# Patient Record
Sex: FEMALE | Race: WHITE | NOT HISPANIC OR LATINO | Employment: OTHER | RURAL
[De-identification: names, ages, dates, MRNs, and addresses within clinical notes are randomized per-mention and may not be internally consistent; named-entity substitution may affect disease eponyms.]

---

## 2021-09-10 ENCOUNTER — OFFICE VISIT (OUTPATIENT)
Dept: FAMILY MEDICINE | Facility: CLINIC | Age: 81
End: 2021-09-10

## 2021-09-10 VITALS
OXYGEN SATURATION: 98 % | WEIGHT: 173.63 LBS | DIASTOLIC BLOOD PRESSURE: 72 MMHG | RESPIRATION RATE: 20 BRPM | HEIGHT: 66 IN | HEART RATE: 84 BPM | SYSTOLIC BLOOD PRESSURE: 120 MMHG | BODY MASS INDEX: 27.91 KG/M2

## 2021-09-10 DIAGNOSIS — F32.A DEPRESSION, UNSPECIFIED DEPRESSION TYPE: ICD-10-CM

## 2021-09-10 DIAGNOSIS — E03.9 HYPOTHYROIDISM, UNSPECIFIED TYPE: Primary | ICD-10-CM

## 2021-09-10 DIAGNOSIS — G47.00 INSOMNIA, UNSPECIFIED TYPE: ICD-10-CM

## 2021-09-10 DIAGNOSIS — Z79.899 ENCOUNTER FOR LONG-TERM CURRENT USE OF MEDICATION: ICD-10-CM

## 2021-09-10 LAB
ALBUMIN SERPL BCP-MCNC: 4 G/DL (ref 3.5–5)
ALBUMIN/GLOB SERPL: 1.1 {RATIO}
ALP SERPL-CCNC: 113 U/L (ref 55–142)
ALT SERPL W P-5'-P-CCNC: 24 U/L (ref 13–56)
ANION GAP SERPL CALCULATED.3IONS-SCNC: 8 MMOL/L (ref 7–16)
AST SERPL W P-5'-P-CCNC: 17 U/L (ref 15–37)
BASOPHILS # BLD AUTO: 0.04 K/UL (ref 0–0.2)
BASOPHILS NFR BLD AUTO: 0.6 % (ref 0–1)
BILIRUB SERPL-MCNC: 1 MG/DL (ref 0–1.2)
BUN SERPL-MCNC: 20 MG/DL (ref 7–18)
BUN/CREAT SERPL: 19 (ref 6–20)
CALCIUM SERPL-MCNC: 9.2 MG/DL (ref 8.5–10.1)
CHLORIDE SERPL-SCNC: 104 MMOL/L (ref 98–107)
CHOLEST SERPL-MCNC: 251 MG/DL (ref 0–200)
CHOLEST/HDLC SERPL: 4.8 {RATIO}
CO2 SERPL-SCNC: 29 MMOL/L (ref 21–32)
CREAT SERPL-MCNC: 1.03 MG/DL (ref 0.55–1.02)
DIFFERENTIAL METHOD BLD: ABNORMAL
EOSINOPHIL # BLD AUTO: 0.3 K/UL (ref 0–0.5)
EOSINOPHIL NFR BLD AUTO: 4.5 % (ref 1–4)
ERYTHROCYTE [DISTWIDTH] IN BLOOD BY AUTOMATED COUNT: 14 % (ref 11.5–14.5)
GLOBULIN SER-MCNC: 3.5 G/DL (ref 2–4)
GLUCOSE SERPL-MCNC: 98 MG/DL (ref 74–106)
HCT VFR BLD AUTO: 41.7 % (ref 38–47)
HDLC SERPL-MCNC: 52 MG/DL (ref 40–60)
HGB BLD-MCNC: 13.6 G/DL (ref 12–16)
IMM GRANULOCYTES # BLD AUTO: 0.02 K/UL (ref 0–0.04)
IMM GRANULOCYTES NFR BLD: 0.3 % (ref 0–0.4)
LDLC SERPL CALC-MCNC: 170 MG/DL
LDLC/HDLC SERPL: 3.3 {RATIO}
LYMPHOCYTES # BLD AUTO: 1.95 K/UL (ref 1–4.8)
LYMPHOCYTES NFR BLD AUTO: 29.1 % (ref 27–41)
MCH RBC QN AUTO: 28.9 PG (ref 27–31)
MCHC RBC AUTO-ENTMCNC: 32.6 G/DL (ref 32–36)
MCV RBC AUTO: 88.7 FL (ref 80–96)
MONOCYTES # BLD AUTO: 0.54 K/UL (ref 0–0.8)
MONOCYTES NFR BLD AUTO: 8 % (ref 2–6)
MPC BLD CALC-MCNC: 10 FL (ref 9.4–12.4)
NEUTROPHILS # BLD AUTO: 3.86 K/UL (ref 1.8–7.7)
NEUTROPHILS NFR BLD AUTO: 57.5 % (ref 53–65)
NONHDLC SERPL-MCNC: 199 MG/DL
NRBC # BLD AUTO: 0 X10E3/UL
NRBC, AUTO (.00): 0 %
PLATELET # BLD AUTO: 194 K/UL (ref 150–400)
POTASSIUM SERPL-SCNC: 4 MMOL/L (ref 3.5–5.1)
PROT SERPL-MCNC: 7.5 G/DL (ref 6.4–8.2)
RBC # BLD AUTO: 4.7 M/UL (ref 4.2–5.4)
SODIUM SERPL-SCNC: 137 MMOL/L (ref 136–145)
TRIGL SERPL-MCNC: 143 MG/DL (ref 35–150)
TSH SERPL DL<=0.005 MIU/L-ACNC: 1.22 UIU/ML (ref 0.36–3.74)
VLDLC SERPL-MCNC: 29 MG/DL
WBC # BLD AUTO: 6.71 K/UL (ref 4.5–11)

## 2021-09-10 PROCEDURE — 80061 LIPID PANEL: CPT | Mod: ,,, | Performed by: CLINICAL MEDICAL LABORATORY

## 2021-09-10 PROCEDURE — 80061 LIPID PANEL: ICD-10-PCS | Mod: ,,, | Performed by: CLINICAL MEDICAL LABORATORY

## 2021-09-10 PROCEDURE — 85025 COMPLETE CBC W/AUTO DIFF WBC: CPT | Mod: ,,, | Performed by: CLINICAL MEDICAL LABORATORY

## 2021-09-10 PROCEDURE — 99214 PR OFFICE/OUTPT VISIT, EST, LEVL IV, 30-39 MIN: ICD-10-PCS | Mod: ,,, | Performed by: FAMILY MEDICINE

## 2021-09-10 PROCEDURE — 85025 CBC WITH DIFFERENTIAL: ICD-10-PCS | Mod: ,,, | Performed by: CLINICAL MEDICAL LABORATORY

## 2021-09-10 PROCEDURE — 80053 COMPREHENSIVE METABOLIC PANEL: ICD-10-PCS | Mod: ,,, | Performed by: CLINICAL MEDICAL LABORATORY

## 2021-09-10 PROCEDURE — 84443 TSH: ICD-10-PCS | Mod: ,,, | Performed by: CLINICAL MEDICAL LABORATORY

## 2021-09-10 PROCEDURE — 99214 OFFICE O/P EST MOD 30 MIN: CPT | Mod: ,,, | Performed by: FAMILY MEDICINE

## 2021-09-10 PROCEDURE — 84443 ASSAY THYROID STIM HORMONE: CPT | Mod: ,,, | Performed by: CLINICAL MEDICAL LABORATORY

## 2021-09-10 PROCEDURE — 80053 COMPREHEN METABOLIC PANEL: CPT | Mod: ,,, | Performed by: CLINICAL MEDICAL LABORATORY

## 2021-09-10 RX ORDER — TRAZODONE HYDROCHLORIDE 150 MG/1
150 TABLET ORAL NIGHTLY
Qty: 30 TABLET | Refills: 2 | Status: SHIPPED | OUTPATIENT
Start: 2021-09-10 | End: 2021-09-10

## 2021-09-10 RX ORDER — TRAZODONE HYDROCHLORIDE 150 MG/1
150 TABLET ORAL NIGHTLY
Qty: 90 TABLET | Refills: 0 | Status: SHIPPED | OUTPATIENT
Start: 2021-09-10 | End: 2022-06-13 | Stop reason: SDUPTHER

## 2021-09-10 RX ORDER — SERTRALINE HYDROCHLORIDE 100 MG/1
150 TABLET, FILM COATED ORAL DAILY
Qty: 45 TABLET | Refills: 2 | Status: SHIPPED | OUTPATIENT
Start: 2021-09-10 | End: 2021-09-10

## 2021-09-10 RX ORDER — SERTRALINE HYDROCHLORIDE 100 MG/1
150 TABLET, FILM COATED ORAL DAILY
Qty: 135 TABLET | Refills: 0 | Status: SHIPPED | OUTPATIENT
Start: 2021-09-10 | End: 2022-06-13 | Stop reason: SDUPTHER

## 2021-09-10 RX ORDER — LEVOTHYROXINE SODIUM 137 UG/1
137 TABLET ORAL DAILY
COMMUNITY
Start: 2021-06-05 | End: 2021-09-10 | Stop reason: SDUPTHER

## 2021-09-10 RX ORDER — LEVOTHYROXINE SODIUM 137 UG/1
137 TABLET ORAL DAILY
Qty: 90 TABLET | Refills: 0 | Status: SHIPPED | OUTPATIENT
Start: 2021-09-10 | End: 2022-06-13 | Stop reason: SDUPTHER

## 2021-09-10 RX ORDER — LEVOTHYROXINE SODIUM 137 UG/1
137 TABLET ORAL DAILY
Qty: 30 TABLET | Refills: 2 | Status: SHIPPED | OUTPATIENT
Start: 2021-09-10 | End: 2021-09-10

## 2021-09-10 RX ORDER — TRAZODONE HYDROCHLORIDE 150 MG/1
150 TABLET ORAL NIGHTLY
COMMUNITY
Start: 2021-06-05 | End: 2021-09-10 | Stop reason: SDUPTHER

## 2021-09-10 RX ORDER — SERTRALINE HYDROCHLORIDE 100 MG/1
150 TABLET, FILM COATED ORAL DAILY
COMMUNITY
Start: 2021-07-26 | End: 2021-09-10 | Stop reason: SDUPTHER

## 2021-12-23 ENCOUNTER — OFFICE VISIT (OUTPATIENT)
Dept: FAMILY MEDICINE | Facility: CLINIC | Age: 81
End: 2021-12-23

## 2021-12-23 DIAGNOSIS — R05.9 COUGH: Primary | ICD-10-CM

## 2021-12-23 LAB
CTP QC/QA: YES
FLUAV AG NPH QL: NEGATIVE
FLUBV AG NPH QL: NEGATIVE
SARS-COV-2 AG RESP QL IA.RAPID: POSITIVE

## 2021-12-23 PROCEDURE — 87428 SARSCOV & INF VIR A&B AG IA: CPT | Mod: QW,,, | Performed by: INTERNAL MEDICINE

## 2021-12-23 PROCEDURE — 87428 POCT SARS-COV2 (COVID) WITH FLU ANTIGEN: ICD-10-PCS | Mod: QW,,, | Performed by: INTERNAL MEDICINE

## 2021-12-23 PROCEDURE — 99499 NO LOS: ICD-10-PCS | Mod: ,,, | Performed by: INTERNAL MEDICINE

## 2021-12-23 PROCEDURE — 99499 UNLISTED E&M SERVICE: CPT | Mod: ,,, | Performed by: INTERNAL MEDICINE

## 2021-12-23 NOTE — PROGRESS NOTES
Patient had positive covid test. Informed of order for zinc and vit c to get OTC, to quarantine for 10 days, and to go to ER for any respiratory problems

## 2022-06-13 ENCOUNTER — OFFICE VISIT (OUTPATIENT)
Dept: FAMILY MEDICINE | Facility: CLINIC | Age: 82
End: 2022-06-13
Payer: MEDICARE

## 2022-06-13 VITALS
TEMPERATURE: 98 F | OXYGEN SATURATION: 98 % | BODY MASS INDEX: 25.36 KG/M2 | RESPIRATION RATE: 20 BRPM | SYSTOLIC BLOOD PRESSURE: 124 MMHG | WEIGHT: 157.81 LBS | DIASTOLIC BLOOD PRESSURE: 62 MMHG | HEIGHT: 66 IN | HEART RATE: 93 BPM

## 2022-06-13 DIAGNOSIS — N28.9 RENAL INSUFFICIENCY: Primary | ICD-10-CM

## 2022-06-13 DIAGNOSIS — F32.A DEPRESSION, UNSPECIFIED DEPRESSION TYPE: ICD-10-CM

## 2022-06-13 DIAGNOSIS — E03.9 HYPOTHYROIDISM, UNSPECIFIED TYPE: ICD-10-CM

## 2022-06-13 DIAGNOSIS — G47.00 INSOMNIA, UNSPECIFIED TYPE: ICD-10-CM

## 2022-06-13 LAB
ANION GAP SERPL CALCULATED.3IONS-SCNC: 10 MMOL/L (ref 7–16)
BUN SERPL-MCNC: 16 MG/DL (ref 7–18)
BUN/CREAT SERPL: 17 (ref 6–20)
CALCIUM SERPL-MCNC: 9.2 MG/DL (ref 8.5–10.1)
CHLORIDE SERPL-SCNC: 108 MMOL/L (ref 98–107)
CO2 SERPL-SCNC: 26 MMOL/L (ref 21–32)
CREAT SERPL-MCNC: 0.94 MG/DL (ref 0.55–1.02)
GLUCOSE SERPL-MCNC: 93 MG/DL (ref 74–106)
POTASSIUM SERPL-SCNC: 4.1 MMOL/L (ref 3.5–5.1)
SODIUM SERPL-SCNC: 140 MMOL/L (ref 136–145)
TSH SERPL DL<=0.005 MIU/L-ACNC: 0.18 UIU/ML (ref 0.36–3.74)

## 2022-06-13 PROCEDURE — 99213 PR OFFICE/OUTPT VISIT, EST, LEVL III, 20-29 MIN: ICD-10-PCS | Mod: ,,, | Performed by: NURSE PRACTITIONER

## 2022-06-13 PROCEDURE — 84443 ASSAY THYROID STIM HORMONE: CPT | Mod: ,,, | Performed by: CLINICAL MEDICAL LABORATORY

## 2022-06-13 PROCEDURE — 80048 BASIC METABOLIC PNL TOTAL CA: CPT | Mod: ,,, | Performed by: CLINICAL MEDICAL LABORATORY

## 2022-06-13 PROCEDURE — 80048 BASIC METABOLIC PANEL: ICD-10-PCS | Mod: ,,, | Performed by: CLINICAL MEDICAL LABORATORY

## 2022-06-13 PROCEDURE — 84443 TSH: ICD-10-PCS | Mod: ,,, | Performed by: CLINICAL MEDICAL LABORATORY

## 2022-06-13 PROCEDURE — 99213 OFFICE O/P EST LOW 20 MIN: CPT | Mod: ,,, | Performed by: NURSE PRACTITIONER

## 2022-06-13 RX ORDER — LEVOTHYROXINE SODIUM 137 UG/1
137 TABLET ORAL DAILY
Qty: 90 TABLET | Refills: 1 | Status: SHIPPED | OUTPATIENT
Start: 2022-06-13 | End: 2022-06-23 | Stop reason: DRUGHIGH

## 2022-06-13 RX ORDER — TRAZODONE HYDROCHLORIDE 150 MG/1
150 TABLET ORAL NIGHTLY
Qty: 90 TABLET | Refills: 1 | Status: SHIPPED | OUTPATIENT
Start: 2022-06-13 | End: 2023-03-29 | Stop reason: SDUPTHER

## 2022-06-13 RX ORDER — SERTRALINE HYDROCHLORIDE 100 MG/1
150 TABLET, FILM COATED ORAL DAILY
Qty: 135 TABLET | Refills: 1 | Status: SHIPPED | OUTPATIENT
Start: 2022-06-13 | End: 2023-03-29 | Stop reason: SDUPTHER

## 2022-06-13 NOTE — PROGRESS NOTES
[facility]  Chief Complaint      Chief Complaint   Patient presents with    Thyroid Problem     Due for repeat TSH and requesting refill of Levothyroxine     Depression     Requesting refill of Zoloft     Insomnia     Requesting refill of Trazodone    Hyperlipidemia     Not on a statin at the current time       History of Present Illness      Heydi Ceja is a 82 y.o. female with chronic conditions of Depression, Insomnia, Hyperlipidemia, and Eczema who presents today for medical refills. She denies any problems today. Her last lipid panel was 09/10/2021 Chol 251 Trig 143  HDL 52. She is not on a statin. Her last TSH was 1.220 (0.358-3.740). She is on Levothyroxine 137 mcg once daily. She is tolerating her Zoloft and Desyrel for her Depression and Insomnia.     Past Medical History:  Past Medical History:   Diagnosis Date    Depression     Eczema     Hyperlipidemia     Insomnia        Past Surgical History:   has no past surgical history on file.    Social History:  Social History     Tobacco Use    Smoking status: Never Smoker    Smokeless tobacco: Never Used   Substance Use Topics    Alcohol use: Never    Drug use: Never       I personally reviewed all past medical, surgical, and social.     Review of Systems   Constitutional: Negative.    HENT: Negative.    Respiratory: Negative.    Cardiovascular: Negative.    Gastrointestinal: Negative.    Endocrine: Negative.    Genitourinary: Negative.    Musculoskeletal: Negative.    Skin: Negative.    Allergic/Immunologic: Negative.    Neurological: Negative.    Hematological: Negative.    Psychiatric/Behavioral: Positive for sleep disturbance.        Medications:  Outpatient Encounter Medications as of 6/13/2022   Medication Sig Dispense Refill    levothyroxine (SYNTHROID) 137 MCG Tab tablet Take 1 tablet (137 mcg total) by mouth once daily. 90 tablet 1    sertraline (ZOLOFT) 100 MG tablet Take 1.5 tablets (150 mg total) by  "mouth once daily. 135 tablet 1    traZODone (DESYREL) 150 MG tablet Take 1 tablet (150 mg total) by mouth nightly. 90 tablet 1    [DISCONTINUED] levothyroxine (SYNTHROID) 137 MCG Tab tablet Take 1 tablet (137 mcg total) by mouth once daily. 90 tablet 0    [DISCONTINUED] sertraline (ZOLOFT) 100 MG tablet Take 1.5 tablets (150 mg total) by mouth once daily. 135 tablet 0    [DISCONTINUED] traZODone (DESYREL) 150 MG tablet Take 1 tablet (150 mg total) by mouth nightly. 90 tablet 0     No facility-administered encounter medications on file as of 6/13/2022.       Allergies:  Review of patient's allergies indicates:  No Known Allergies    Health Maintenance:    There is no immunization history on file for this patient.   Health Maintenance   Topic Date Due    TETANUS VACCINE  Never done    DEXA Scan  Never done    Lipid Panel  09/10/2026        Physical Exam      Vital Signs  Temp: 98.4 °F (36.9 °C)  Temp src: Oral  Pulse: 93  Resp: 20  SpO2: 98 %  BP: 124/62  BP Location: Right arm  Patient Position: Sitting  Height and Weight  Height: 5' 6" (167.6 cm)  Weight: 71.6 kg (157 lb 12.8 oz)  BSA (Calculated - sq m): 1.83 sq meters  BMI (Calculated): 25.5  Weight in (lb) to have BMI = 25: 154.6]    Physical Exam  HENT:      Right Ear: Tympanic membrane normal.      Left Ear: Tympanic membrane normal.      Mouth/Throat:      Mouth: Mucous membranes are moist.   Cardiovascular:      Rate and Rhythm: Normal rate and regular rhythm.      Pulses: Normal pulses.      Heart sounds: Normal heart sounds.   Pulmonary:      Effort: Pulmonary effort is normal.      Breath sounds: Normal breath sounds.   Abdominal:      General: Bowel sounds are normal.      Palpations: Abdomen is soft.   Neurological:      Mental Status: She is alert and oriented to person, place, and time.   Psychiatric:         Behavior: Behavior normal.          Laboratory:  CBC:  Recent Labs   Lab 09/10/21  1633   WBC 6.71   RBC 4.70   Hemoglobin 13.6 "   Hematocrit 41.7   Platelet Count 194   MCV 88.7   MCH 28.9   MCHC 32.6     CMP:  Recent Labs   Lab 09/10/21  1633   Glucose 98   Calcium 9.2   Albumin 4.0   Total Protein 7.5   Sodium 137   Potassium 4.0   CO2 29   Chloride 104   BUN 20 H   Alk Phos 113   ALT 24   AST 17   Bilirubin, Total 1.0     LIPIDS:  Recent Labs   Lab 09/10/21  1633   TSH 1.220   HDL Cholesterol 52   Cholesterol 251 H   Triglycerides 143   LDL Calculated 170   Cholesterol/HDL Ratio (Risk Factor) 4.8   Non-     TSH:  Recent Labs   Lab 09/10/21  1633   TSH 1.220           Assessment/Plan      1. Hypothyroidism, unspecified type  - levothyroxine (SYNTHROID) 137 MCG Tab tablet; Take 1 tablet (137 mcg total) by mouth once daily.  Dispense: 90 tablet; Refill: 1  - TSH; Future  - TSH    2. Depression, unspecified depression type  - sertraline (ZOLOFT) 100 MG tablet; Take 1.5 tablets (150 mg total) by mouth once daily.  Dispense: 135 tablet; Refill: 1    3. Insomnia, unspecified type  - traZODone (DESYREL) 150 MG tablet; Take 1 tablet (150 mg total) by mouth nightly.  Dispense: 90 tablet; Refill: 1    4. Renal insufficiency  - Basic Metabolic Panel; Future  - Basic Metabolic Panel        Return to clinic in 6  months.    EVON [facility]

## 2022-06-22 ENCOUNTER — APPOINTMENT (OUTPATIENT)
Dept: RADIOLOGY | Facility: CLINIC | Age: 82
End: 2022-06-22
Attending: INTERNAL MEDICINE
Payer: MEDICARE

## 2022-06-22 ENCOUNTER — OFFICE VISIT (OUTPATIENT)
Dept: FAMILY MEDICINE | Facility: CLINIC | Age: 82
End: 2022-06-22
Payer: MEDICARE

## 2022-06-22 VITALS
BODY MASS INDEX: 25.29 KG/M2 | HEIGHT: 66 IN | WEIGHT: 157.38 LBS | OXYGEN SATURATION: 96 % | DIASTOLIC BLOOD PRESSURE: 84 MMHG | TEMPERATURE: 97 F | RESPIRATION RATE: 20 BRPM | SYSTOLIC BLOOD PRESSURE: 128 MMHG | HEART RATE: 105 BPM

## 2022-06-22 DIAGNOSIS — R19.7 DIARRHEA, UNSPECIFIED TYPE: Primary | ICD-10-CM

## 2022-06-22 DIAGNOSIS — R19.7 DIARRHEA, UNSPECIFIED TYPE: ICD-10-CM

## 2022-06-22 DIAGNOSIS — R94.5 ABNORMAL RESULTS OF LIVER FUNCTION STUDIES: ICD-10-CM

## 2022-06-22 PROCEDURE — 74018 RADEX ABDOMEN 1 VIEW: CPT | Mod: TC,RHCUB | Performed by: INTERNAL MEDICINE

## 2022-06-22 PROCEDURE — 99214 PR OFFICE/OUTPT VISIT, EST, LEVL IV, 30-39 MIN: ICD-10-PCS | Mod: ,,, | Performed by: INTERNAL MEDICINE

## 2022-06-22 PROCEDURE — 80074 ACUTE HEPATITIS PANEL: CPT | Mod: ,,, | Performed by: CLINICAL MEDICAL LABORATORY

## 2022-06-22 PROCEDURE — 99214 OFFICE O/P EST MOD 30 MIN: CPT | Mod: ,,, | Performed by: INTERNAL MEDICINE

## 2022-06-22 PROCEDURE — 80074 HEPATITIS PANEL, ACUTE: ICD-10-PCS | Mod: ,,, | Performed by: CLINICAL MEDICAL LABORATORY

## 2022-06-22 NOTE — PROGRESS NOTES
Subjective:       Patient ID: Heydi Ceja is a 82 y.o. female.    Chief Complaint: Establish Care    Patient is here today for new patient evaluation. Has past medical history of depression, insomnia, hyperlipidemia, and eczema. Patient recent labs reviewed and all within normal limits. Patient is concerned she has Hep C and states she has all the symptoms; diarrhea, discoloration of toes, itching, etc. Will order Hep panel today. Patient states she has had the diarrhea for the past 3 weeks. Will order stools for C.Diff, WBC with Diff, O&P, GS and Cul. Will also order KUB today. Will follow in 1 month.        Current Medications:    Current Outpatient Medications:     levothyroxine (SYNTHROID) 137 MCG Tab tablet, Take 1 tablet (137 mcg total) by mouth once daily., Disp: 90 tablet, Rfl: 1    sertraline (ZOLOFT) 100 MG tablet, Take 1.5 tablets (150 mg total) by mouth once daily., Disp: 135 tablet, Rfl: 1    traZODone (DESYREL) 150 MG tablet, Take 1 tablet (150 mg total) by mouth nightly., Disp: 90 tablet, Rfl: 1    Last Labs:     Office Visit on 06/13/2022   Component Date Value    TSH 06/13/2022 0.176 (A)    Sodium 06/13/2022 140     Potassium 06/13/2022 4.1     Chloride 06/13/2022 108 (A)    CO2 06/13/2022 26     Anion Gap 06/13/2022 10     Glucose 06/13/2022 93     BUN 06/13/2022 16     Creatinine 06/13/2022 0.94     BUN/Creatinine Ratio 06/13/2022 17     Calcium 06/13/2022 9.2     eGFR 06/13/2022 61        Last Imaging:  X-Ray KUB  Narrative: EXAMINATION:  XR KUB    CLINICAL HISTORY:  Abdominal pain    COMPARISON:  None available    FINDINGS:  No free fluid or free air seen.  The bowel gas pattern appears within normal limits.  Calcification seen in the right upper quadrant, detail is limited.  No other abnormal calcifications are present.  No other abnormality is identified.  Impression: Calcifications right upper quadrant could represent cholelithiasis or possibly renal  calculi.    Electronically signed by: Leonidas Orozco  Date:    06/22/2022  Time:    16:13         Review of Systems   Constitutional: Positive for fatigue.   Gastrointestinal: Positive for diarrhea.   All other systems reviewed and are negative.        Objective:      Physical Exam  Vitals reviewed.   Constitutional:       Appearance: Normal appearance.   Cardiovascular:      Rate and Rhythm: Normal rate and regular rhythm.      Pulses: Normal pulses.      Heart sounds: Normal heart sounds.   Pulmonary:      Effort: Pulmonary effort is normal.      Breath sounds: Normal breath sounds.   Abdominal:      General: Abdomen is flat. Bowel sounds are normal.      Palpations: Abdomen is soft.   Musculoskeletal:         General: Normal range of motion.      Cervical back: Normal range of motion and neck supple.   Skin:     General: Skin is warm and dry.   Neurological:      General: No focal deficit present.      Mental Status: She is alert and oriented to person, place, and time. Mental status is at baseline.         Assessment:       1. Diarrhea, unspecified type  Hepatitis Panel, Acute    X-Ray KUB    Ova and Parasite Exam    C Diff Toxin by PCR    Fecal leukocytes    Gram Stain    Hepatitis Panel, Acute   2. Abnormal results of liver function studies   Hepatitis Panel, Acute    Hepatitis Panel, Acute        Plan:         Heydi was seen today for establish care.    Diagnoses and all orders for this visit:    Diarrhea, unspecified type  -     Hepatitis Panel, Acute; Future  -     X-Ray KUB; Future  -     Ova and Parasite Exam; Future  -     C Diff Toxin by PCR; Future  -     Fecal leukocytes; Future  -     Gram Stain; Future  -     Hepatitis Panel, Acute    Abnormal results of liver function studies   -     Hepatitis Panel, Acute; Future  -     Hepatitis Panel, Acute

## 2022-06-23 LAB
HAV IGM SER QL: NORMAL
HBV CORE IGM SER QL: NORMAL
HBV SURFACE AG SERPL QL IA: NORMAL
HCV AB SER QL: NORMAL

## 2022-06-23 RX ORDER — LEVOTHYROXINE SODIUM 125 UG/1
125 TABLET ORAL
Qty: 30 TABLET | Refills: 1 | Status: SHIPPED | OUTPATIENT
Start: 2022-06-23 | End: 2022-07-12 | Stop reason: SDUPTHER

## 2022-06-23 NOTE — TELEPHONE ENCOUNTER
Pt. Informed of abnormal TSH level 0.176. Pt. Informed we will send in rx for Levothyroxine 125 mcg - take 1 tab po daily and return to clinic in 4-6 weeks to repeat TSH. Standing order will be placed in epic. Pt. Expressed verbal understanding. Rx escribed to [facility] pharmacy per pt.'s request.

## 2022-06-24 LAB
FECAL LEUKOCYTES: NORMAL /HPF
GRAM STN SPEC: NORMAL

## 2022-06-24 PROCEDURE — 87209 SMEAR COMPLEX STAIN: CPT | Mod: ,,, | Performed by: CLINICAL MEDICAL LABORATORY

## 2022-06-24 PROCEDURE — 87205 GRAM STAIN: ICD-10-PCS | Mod: ,,, | Performed by: CLINICAL MEDICAL LABORATORY

## 2022-06-24 PROCEDURE — 87177 OVA AND PARASITE EXAM: ICD-10-PCS | Mod: ,,, | Performed by: CLINICAL MEDICAL LABORATORY

## 2022-06-24 PROCEDURE — 87205 SMEAR GRAM STAIN: CPT | Mod: ,,, | Performed by: CLINICAL MEDICAL LABORATORY

## 2022-06-24 PROCEDURE — 87177 OVA AND PARASITES SMEARS: CPT | Mod: ,,, | Performed by: CLINICAL MEDICAL LABORATORY

## 2022-06-24 PROCEDURE — 87493 C DIFF AMPLIFIED PROBE: CPT | Mod: ,,, | Performed by: CLINICAL MEDICAL LABORATORY

## 2022-06-24 PROCEDURE — 89055 FECAL LEUKOCYTES: ICD-10-PCS | Mod: ,,, | Performed by: CLINICAL MEDICAL LABORATORY

## 2022-06-24 PROCEDURE — 89055 LEUKOCYTE ASSESSMENT FECAL: CPT | Mod: ,,, | Performed by: CLINICAL MEDICAL LABORATORY

## 2022-06-24 PROCEDURE — 87209 OVA AND PARASITE EXAM: ICD-10-PCS | Mod: ,,, | Performed by: CLINICAL MEDICAL LABORATORY

## 2022-06-24 PROCEDURE — 87493 C DIFF TOXIN BY PCR: ICD-10-PCS | Mod: ,,, | Performed by: CLINICAL MEDICAL LABORATORY

## 2022-06-25 LAB — C DIFF TOX A+B STL IA-ACNC: NEGATIVE

## 2022-06-26 LAB
O+P STL CONC: NORMAL
TRICHROME SMEAR: NORMAL

## 2022-07-12 ENCOUNTER — OFFICE VISIT (OUTPATIENT)
Dept: FAMILY MEDICINE | Facility: CLINIC | Age: 82
End: 2022-07-12
Payer: MEDICARE

## 2022-07-12 VITALS
TEMPERATURE: 96 F | BODY MASS INDEX: 25.39 KG/M2 | HEIGHT: 66 IN | HEART RATE: 93 BPM | DIASTOLIC BLOOD PRESSURE: 64 MMHG | RESPIRATION RATE: 20 BRPM | WEIGHT: 158 LBS | OXYGEN SATURATION: 96 % | SYSTOLIC BLOOD PRESSURE: 108 MMHG

## 2022-07-12 DIAGNOSIS — K80.20 CALCULUS OF GALLBLADDER WITHOUT CHOLECYSTITIS WITHOUT OBSTRUCTION: ICD-10-CM

## 2022-07-12 DIAGNOSIS — E03.8 OTHER SPECIFIED HYPOTHYROIDISM: Primary | ICD-10-CM

## 2022-07-12 DIAGNOSIS — R11.0 NAUSEA: ICD-10-CM

## 2022-07-12 DIAGNOSIS — K83.1 CHOLESTASIS: ICD-10-CM

## 2022-07-12 DIAGNOSIS — R19.7 DIARRHEA, UNSPECIFIED TYPE: ICD-10-CM

## 2022-07-12 PROCEDURE — 99214 OFFICE O/P EST MOD 30 MIN: CPT | Mod: ,,, | Performed by: INTERNAL MEDICINE

## 2022-07-12 PROCEDURE — 99214 PR OFFICE/OUTPT VISIT, EST, LEVL IV, 30-39 MIN: ICD-10-PCS | Mod: ,,, | Performed by: INTERNAL MEDICINE

## 2022-07-12 RX ORDER — LEVOTHYROXINE SODIUM 125 UG/1
125 TABLET ORAL
Qty: 30 TABLET | Refills: 3 | Status: SHIPPED | OUTPATIENT
Start: 2022-07-12 | End: 2022-11-16

## 2022-07-12 RX ORDER — DIPHENOXYLATE HYDROCHLORIDE AND ATROPINE SULFATE 2.5; .025 MG/1; MG/1
1 TABLET ORAL 2 TIMES DAILY PRN
Qty: 20 TABLET | Refills: 0 | Status: SHIPPED | OUTPATIENT
Start: 2022-07-12 | End: 2022-07-22

## 2022-07-12 RX ORDER — ONDANSETRON 4 MG/1
4 TABLET, FILM COATED ORAL 2 TIMES DAILY PRN
Qty: 20 TABLET | Refills: 0 | Status: ON HOLD | OUTPATIENT
Start: 2022-07-12 | End: 2022-09-02 | Stop reason: HOSPADM

## 2022-07-13 NOTE — PROGRESS NOTES
Subjective:       Patient ID: Heydi Ceja is a 82 y.o. female.    Chief Complaint: Results    Patient is here today for a follow up evaluation. Patient has a complaint of severe lower abdominal discomfort. Recent KUB reviewed, results show possible kidney stones or gallstones. Will order STAT CT Abdomen/Pelvis for possible kidney stones or gallstones. Patient has a complaint of severe nausea and diarrhea. Will order Zofran 4mg PO BID PRN for nausea #20. Will order Lomotil PO BID PRN for diarrhea #20. Patient concerned about TSH. Will order TSH in 3 weeks. Will order Synthroid 125 mcg PO QD #30 RF3. Will follow in 2 months.       Current Medications:    Current Outpatient Medications:     sertraline (ZOLOFT) 100 MG tablet, Take 1.5 tablets (150 mg total) by mouth once daily., Disp: 135 tablet, Rfl: 1    traZODone (DESYREL) 150 MG tablet, Take 1 tablet (150 mg total) by mouth nightly., Disp: 90 tablet, Rfl: 1    diphenoxylate-atropine 2.5-0.025 mg (LOMOTIL) 2.5-0.025 mg per tablet, Take 1 tablet by mouth 2 (two) times daily as needed for Diarrhea., Disp: 20 tablet, Rfl: 0    levothyroxine (SYNTHROID) 125 MCG tablet, Take 1 tablet (125 mcg total) by mouth before breakfast., Disp: 30 tablet, Rfl: 3    ondansetron (ZOFRAN) 4 MG tablet, Take 1 tablet (4 mg total) by mouth 2 (two) times daily as needed for Nausea., Disp: 20 tablet, Rfl: 0    Last Labs:     Office Visit on 06/22/2022   Component Date Value    Hepatits A Ab, IgM 06/22/2022 Non-Reactive     Hepatitis B Surface Ag 06/22/2022 Non-Reactive     Hepatitis B Core Ab, IgM 06/22/2022 Non-Reactive     Hepatitis C Ab 06/22/2022 Non-Reactive     Ova and Parasite Exam 06/24/2022 No Ova and Parasites Seen     Trichrome Smear 06/24/2022 No Ova and Parasites Seen     Clostridium difficile To* 06/24/2022 Negative     Fecal Leukocytes 06/24/2022 None Seen     Gram Stain Result 06/24/2022 Many Gram positive cocci     Gram Stain Result 06/24/2022 Many Gram  Positive Rods     Gram Stain Result 06/24/2022 Many Gram negative cocci     Gram Stain Result 06/24/2022 Many Gram Negative Rods    Office Visit on 06/13/2022   Component Date Value    TSH 06/13/2022 0.176 (A)    Sodium 06/13/2022 140     Potassium 06/13/2022 4.1     Chloride 06/13/2022 108 (A)    CO2 06/13/2022 26     Anion Gap 06/13/2022 10     Glucose 06/13/2022 93     BUN 06/13/2022 16     Creatinine 06/13/2022 0.94     BUN/Creatinine Ratio 06/13/2022 17     Calcium 06/13/2022 9.2     eGFR 06/13/2022 61        Last Imaging:  X-Ray KUB  Narrative: EXAMINATION:  XR KUB    CLINICAL HISTORY:  Abdominal pain    COMPARISON:  None available    FINDINGS:  No free fluid or free air seen.  The bowel gas pattern appears within normal limits.  Calcification seen in the right upper quadrant, detail is limited.  No other abnormal calcifications are present.  No other abnormality is identified.  Impression: Calcifications right upper quadrant could represent cholelithiasis or possibly renal calculi.    Electronically signed by: Leonidas Orozco  Date:    06/22/2022  Time:    16:13         Review of Systems   Gastrointestinal: Positive for abdominal pain, diarrhea and nausea.   All other systems reviewed and are negative.        Objective:      Physical Exam  Vitals reviewed.   Constitutional:       Appearance: Normal appearance. She is normal weight.   Cardiovascular:      Rate and Rhythm: Normal rate and regular rhythm.      Pulses: Normal pulses.      Heart sounds: Normal heart sounds.   Pulmonary:      Effort: Pulmonary effort is normal.      Breath sounds: Normal breath sounds.   Abdominal:      General: Abdomen is flat. Bowel sounds are normal.      Palpations: Abdomen is soft.   Musculoskeletal:         General: Normal range of motion.      Cervical back: Normal range of motion and neck supple.   Skin:     General: Skin is warm and dry.   Neurological:      General: No focal deficit present.      Mental  Status: She is alert and oriented to person, place, and time. Mental status is at baseline.         Assessment:       1. Other specified hypothyroidism  TSH   2. Calculus of gallbladder without cholecystitis without obstruction  CT Abdomen Pelvis  Without Contrast   3. Cholestasis  CT Abdomen Pelvis  Without Contrast   4. Diarrhea, unspecified type     5. Nausea          Plan:         Heydi was seen today for results.    Diagnoses and all orders for this visit:    Other specified hypothyroidism  -     TSH; Future    Calculus of gallbladder without cholecystitis without obstruction  -     CT Abdomen Pelvis  Without Contrast; Future    Cholestasis  -     CT Abdomen Pelvis  Without Contrast; Future    Diarrhea, unspecified type    Nausea    Other orders  -     levothyroxine (SYNTHROID) 125 MCG tablet; Take 1 tablet (125 mcg total) by mouth before breakfast.  -     diphenoxylate-atropine 2.5-0.025 mg (LOMOTIL) 2.5-0.025 mg per tablet; Take 1 tablet by mouth 2 (two) times daily as needed for Diarrhea.  -     ondansetron (ZOFRAN) 4 MG tablet; Take 1 tablet (4 mg total) by mouth 2 (two) times daily as needed for Nausea.

## 2022-07-13 NOTE — PATIENT INSTRUCTIONS
Heydi was seen today for results.    Diagnoses and all orders for this visit:    Other specified hypothyroidism  -     TSH; Future    Calculus of gallbladder without cholecystitis without obstruction  -     CT Abdomen Pelvis  Without Contrast; Future    Cholestasis  -     CT Abdomen Pelvis  Without Contrast; Future    Diarrhea, unspecified type    Nausea    Other orders  -     levothyroxine (SYNTHROID) 125 MCG tablet; Take 1 tablet (125 mcg total) by mouth before breakfast.  -     diphenoxylate-atropine 2.5-0.025 mg (LOMOTIL) 2.5-0.025 mg per tablet; Take 1 tablet by mouth 2 (two) times daily as needed for Diarrhea.  -     ondansetron (ZOFRAN) 4 MG tablet; Take 1 tablet (4 mg total) by mouth 2 (two) times daily as needed for Nausea.

## 2022-07-15 ENCOUNTER — OFFICE VISIT (OUTPATIENT)
Dept: FAMILY MEDICINE | Facility: CLINIC | Age: 82
End: 2022-07-15
Payer: MEDICARE

## 2022-07-15 VITALS
OXYGEN SATURATION: 97 % | BODY MASS INDEX: 25.39 KG/M2 | TEMPERATURE: 98 F | WEIGHT: 158 LBS | HEIGHT: 66 IN | RESPIRATION RATE: 18 BRPM | HEART RATE: 81 BPM

## 2022-07-15 DIAGNOSIS — Z20.822 CONTACT WITH AND (SUSPECTED) EXPOSURE TO COVID-19: Primary | ICD-10-CM

## 2022-07-15 LAB
CTP QC/QA: YES
FLUAV AG NPH QL: NEGATIVE
FLUBV AG NPH QL: NEGATIVE
SARS-COV-2 AG RESP QL IA.RAPID: NEGATIVE

## 2022-07-15 PROCEDURE — 99499 NO LOS: ICD-10-PCS | Mod: ,,, | Performed by: NURSE PRACTITIONER

## 2022-07-15 PROCEDURE — 99499 UNLISTED E&M SERVICE: CPT | Mod: ,,, | Performed by: NURSE PRACTITIONER

## 2022-07-15 PROCEDURE — 87428 SARSCOV & INF VIR A&B AG IA: CPT | Mod: RHCUB | Performed by: NURSE PRACTITIONER

## 2022-07-15 NOTE — PROGRESS NOTES
1357-patient here to be covid tested. She says she was exposed 2 days ago. Her son is a resident a a nursing facility and he tested positive. Patient asked to be called with results in about 30 minutes due to her cell phone almost dead and she doesn't have a  with her.   1801-NPRadha notified.  3263-called patient to let her know of negative test results but be sure to watch if symptoms get worse. Urged patient to go to ER if she has severe shortness of breath. Voiced understanding.

## 2022-07-18 DIAGNOSIS — Z71.89 COMPLEX CARE COORDINATION: ICD-10-CM

## 2022-07-22 ENCOUNTER — HOSPITAL ENCOUNTER (OUTPATIENT)
Dept: RADIOLOGY | Facility: HOSPITAL | Age: 82
Discharge: HOME | End: 2022-07-22
Attending: INTERNAL MEDICINE
Payer: MEDICARE

## 2022-07-22 ENCOUNTER — TELEPHONE (OUTPATIENT)
Dept: FAMILY MEDICINE | Facility: CLINIC | Age: 82
End: 2022-07-22
Payer: MEDICARE

## 2022-07-22 DIAGNOSIS — K83.1 CHOLESTASIS: ICD-10-CM

## 2022-07-22 DIAGNOSIS — K80.20 CALCULUS OF GALLBLADDER WITHOUT CHOLECYSTITIS WITHOUT OBSTRUCTION: ICD-10-CM

## 2022-07-22 PROCEDURE — 74176 CT ABD & PELVIS W/O CONTRAST: CPT | Mod: TC

## 2022-07-22 PROCEDURE — 74176 CT ABD & PELVIS W/O CONTRAST: CPT | Mod: 26,,, | Performed by: STUDENT IN AN ORGANIZED HEALTH CARE EDUCATION/TRAINING PROGRAM

## 2022-07-22 PROCEDURE — 74176 CT ABDOMEN PELVIS WITHOUT CONTRAST: ICD-10-PCS | Mod: 26,,, | Performed by: STUDENT IN AN ORGANIZED HEALTH CARE EDUCATION/TRAINING PROGRAM

## 2022-07-22 NOTE — TELEPHONE ENCOUNTER
----- Message from Jose Rodriguez MD sent at 7/22/2022 12:44 PM CDT -----  Need to see in  1 week please  abnl results       Called other number on profile, Devante says patient is her cousin and is supposed to come by her house today. She says she will tell her to call. Informed Karenvaleriano that patient can make appointment with the  when she calls back.

## 2022-07-22 NOTE — TELEPHONE ENCOUNTER
1016-Dr. Levy called from [phone]. He reports patient results from ct scan shows left breast CA and will need referral. Informed him Dr. Rodriguez not in today, will inform him of results.  1035-attempted to call patient to schedule appointment for Monday, no answer, not able to leave message.  1102-called patient, no answer, left message to call back and make a follow up appointment for Monday.

## 2022-07-25 ENCOUNTER — OFFICE VISIT (OUTPATIENT)
Dept: FAMILY MEDICINE | Facility: CLINIC | Age: 82
End: 2022-07-25
Payer: MEDICARE

## 2022-07-25 VITALS
TEMPERATURE: 96 F | RESPIRATION RATE: 20 BRPM | SYSTOLIC BLOOD PRESSURE: 110 MMHG | OXYGEN SATURATION: 95 % | HEIGHT: 66 IN | HEART RATE: 104 BPM | DIASTOLIC BLOOD PRESSURE: 70 MMHG | BODY MASS INDEX: 25.26 KG/M2 | WEIGHT: 157.19 LBS

## 2022-07-25 DIAGNOSIS — N63.20 LEFT BREAST MASS: Primary | ICD-10-CM

## 2022-07-25 DIAGNOSIS — Z12.31 ENCOUNTER FOR SCREENING MAMMOGRAM FOR MALIGNANT NEOPLASM OF BREAST: ICD-10-CM

## 2022-07-25 DIAGNOSIS — K80.20 GALLSTONES: ICD-10-CM

## 2022-07-25 PROCEDURE — 99214 OFFICE O/P EST MOD 30 MIN: CPT | Mod: ,,, | Performed by: INTERNAL MEDICINE

## 2022-07-25 PROCEDURE — 99214 PR OFFICE/OUTPT VISIT, EST, LEVL IV, 30-39 MIN: ICD-10-PCS | Mod: ,,, | Performed by: INTERNAL MEDICINE

## 2022-07-25 NOTE — PROGRESS NOTES
Subjective:       Patient ID: Heydi Ceja is a 82 y.o. female.    Chief Complaint: Follow-up and Results (CT Results)    Patient is here today for check up evaluation. Patient recent CT abdomen reviewed and shows large stone in neck of gallbladder and multiple stones throughout  And it was recommended to be referred to general surgery. Patient reports no pain on palpation of CT also showed mass found in left breast and will need to refer to the breast clinic. States she has not has mammogram in a long time. States will go to the walk in clinic ASAP. Will follow in 6 weeks.      Current Medications:    Current Outpatient Medications:     levothyroxine (SYNTHROID) 125 MCG tablet, Take 1 tablet (125 mcg total) by mouth before breakfast., Disp: 30 tablet, Rfl: 3    ondansetron (ZOFRAN) 4 MG tablet, Take 1 tablet (4 mg total) by mouth 2 (two) times daily as needed for Nausea., Disp: 20 tablet, Rfl: 0    sertraline (ZOLOFT) 100 MG tablet, Take 1.5 tablets (150 mg total) by mouth once daily., Disp: 135 tablet, Rfl: 1    traZODone (DESYREL) 150 MG tablet, Take 1 tablet (150 mg total) by mouth nightly., Disp: 90 tablet, Rfl: 1    Last Labs:     Office Visit on 07/15/2022   Component Date Value    SARS Coronavirus 2 Antig* 07/15/2022 Negative     Rapid Influenza A Ag 07/15/2022 Negative     Rapid Influenza B Ag 07/15/2022 Negative      Acceptab* 07/15/2022 Yes        Last Imaging:  CT Abdomen Pelvis  Without Contrast  Narrative: EXAMINATION:  CT ABDOMEN PELVIS WITHOUT CONTRAST    CLINICAL HISTORY:  gall stones vs renal stones;Calculus of gallbladder without cholecystitis without obstruction    COMPARISON:  6/22/22    TECHNIQUE:  CT ABDOMEN PELVIS WITHOUT CONTRAST    FINDINGS:  Lower lobes: Clear.    Cardiac: No effusion.    Chest wall: There is a mass located within the left breast measuring up to 2.5 cm.    Abdomen:    Hepatobiliary/gallbladder: No focal liver lesion.  Large gallstone in the  gallbladder neck measuring 2.3 cm.  There are multiple other gallstones located within the gallbladder.  No ductal obstruction.    Spleen: Enlarged spleen measuring 13 cm    Pancreas: Normal for noncontrast technique.    Adrenal/Genitourinary system: Normal for noncontrast technique.    Bowel and Mesentery: There is no evidence for bowel obstruction.    Peritoneum: Normal.    Retroperitoneum: No enlarged lymph nodes.    Vasculature: Vascular disease    Lymph nodes: No enlarged lymph nodes.    Abdominal wall: Normal.    Osseous structures: Postoperative changes of the hips  Impression: Large gallstone in the gallbladder neck measuring 2.3 cm. There are multiple other gallstones located within the gallbladder.  Given the size and the positioning of the large gallstone, the patient may need to be refer to a general surgeon.    Common bile duct is normal in size.    Mass located within the left breast measuring up to 2.5 cm.  Patient needs to be referred to a breast center.    Findings reported to Dr. Rodriguez's nurse at the time of dictation.    Electronically signed by: Samuel Levy  Date:    07/22/2022  Time:    10:19         Review of Systems   All other systems reviewed and are negative.        Objective:      Physical Exam  Vitals reviewed.   Constitutional:       Appearance: Normal appearance.   Cardiovascular:      Rate and Rhythm: Normal rate and regular rhythm.      Pulses: Normal pulses.      Heart sounds: Normal heart sounds.   Pulmonary:      Effort: Pulmonary effort is normal.      Breath sounds: Normal breath sounds.   Abdominal:      General: Abdomen is flat. Bowel sounds are normal.      Palpations: Abdomen is soft.   Musculoskeletal:         General: Normal range of motion.      Cervical back: Normal range of motion and neck supple.   Skin:     General: Skin is warm and dry.   Neurological:      General: No focal deficit present.      Mental Status: She is alert and oriented to person, place, and  time. Mental status is at baseline.         Assessment:       1. Left breast mass  Ambulatory referral/consult to General Surgery    Mammo Digital Screening Bilat   2. Gallstones  Ambulatory referral/consult to General Surgery   3. Encounter for screening mammogram for malignant neoplasm of breast   Mammo Digital Screening Bilat        Plan:         Heydi was seen today for follow-up and results.    Diagnoses and all orders for this visit:    Left breast mass  -     Ambulatory referral/consult to General Surgery; Future  -     Mammo Digital Screening Bilat; Future    Gallstones  -     Ambulatory referral/consult to General Surgery; Future    Encounter for screening mammogram for malignant neoplasm of breast   -     Mammo Digital Screening Bilat; Future

## 2022-07-26 NOTE — PATIENT INSTRUCTIONS
Heydi was seen today for follow-up and results.    Diagnoses and all orders for this visit:    Left breast mass  -     Ambulatory referral/consult to General Surgery; Future  -     Mammo Digital Screening Bilat; Future    Gallstones  -     Ambulatory referral/consult to General Surgery; Future    Encounter for screening mammogram for malignant neoplasm of breast   -     Mammo Digital Screening Bilat; Future

## 2022-07-28 ENCOUNTER — OFFICE VISIT (OUTPATIENT)
Dept: FAMILY MEDICINE | Facility: CLINIC | Age: 82
End: 2022-07-28
Payer: MEDICARE

## 2022-07-28 VITALS
RESPIRATION RATE: 20 BRPM | BODY MASS INDEX: 25.37 KG/M2 | HEART RATE: 91 BPM | OXYGEN SATURATION: 99 % | DIASTOLIC BLOOD PRESSURE: 58 MMHG | SYSTOLIC BLOOD PRESSURE: 103 MMHG | TEMPERATURE: 97 F | HEIGHT: 66 IN

## 2022-07-28 DIAGNOSIS — J34.89 STUFFY AND RUNNY NOSE: ICD-10-CM

## 2022-07-28 DIAGNOSIS — Z20.822 EXPOSURE TO CONFIRMED CASE OF COVID-19: ICD-10-CM

## 2022-07-28 DIAGNOSIS — Z20.822 ENCOUNTER FOR LABORATORY TESTING FOR COVID-19 VIRUS: Primary | ICD-10-CM

## 2022-07-28 PROCEDURE — 99499 UNLISTED E&M SERVICE: CPT | Mod: ,,, | Performed by: INTERNAL MEDICINE

## 2022-07-28 PROCEDURE — 87428 SARSCOV & INF VIR A&B AG IA: CPT | Mod: RHCUB | Performed by: INTERNAL MEDICINE

## 2022-07-28 PROCEDURE — 99499 NO LOS: ICD-10-PCS | Mod: ,,, | Performed by: INTERNAL MEDICINE

## 2022-07-28 NOTE — PROGRESS NOTES
Call made to Heydi Ceja to inform her that her Covid and Flu test were negative. Pt stated that she does not want to come inside to be seen and that she just wanted to test because she ahs been at the nursing home visiting her son and a few of the residents and workers now have Covid.

## 2022-07-29 ENCOUNTER — OFFICE VISIT (OUTPATIENT)
Dept: FAMILY MEDICINE | Facility: CLINIC | Age: 82
End: 2022-07-29
Payer: MEDICARE

## 2022-07-29 VITALS
HEART RATE: 95 BPM | HEIGHT: 66 IN | BODY MASS INDEX: 25.1 KG/M2 | WEIGHT: 156.19 LBS | TEMPERATURE: 98 F | OXYGEN SATURATION: 95 % | RESPIRATION RATE: 20 BRPM | DIASTOLIC BLOOD PRESSURE: 70 MMHG | SYSTOLIC BLOOD PRESSURE: 102 MMHG

## 2022-07-29 DIAGNOSIS — H10.9 CONJUNCTIVITIS OF BOTH EYES, UNSPECIFIED CONJUNCTIVITIS TYPE: ICD-10-CM

## 2022-07-29 DIAGNOSIS — H01.001 BLEPHARITIS OF UPPER EYELIDS OF BOTH EYES, UNSPECIFIED TYPE: ICD-10-CM

## 2022-07-29 DIAGNOSIS — J30.9 ALLERGIC RHINITIS, UNSPECIFIED SEASONALITY, UNSPECIFIED TRIGGER: Primary | ICD-10-CM

## 2022-07-29 DIAGNOSIS — H01.004 BLEPHARITIS OF UPPER EYELIDS OF BOTH EYES, UNSPECIFIED TYPE: ICD-10-CM

## 2022-07-29 PROCEDURE — 99213 PR OFFICE/OUTPT VISIT, EST, LEVL III, 20-29 MIN: ICD-10-PCS | Mod: ,,, | Performed by: NURSE PRACTITIONER

## 2022-07-29 PROCEDURE — 99213 OFFICE O/P EST LOW 20 MIN: CPT | Mod: ,,, | Performed by: NURSE PRACTITIONER

## 2022-07-29 RX ORDER — LORATADINE 10 MG/1
10 TABLET ORAL DAILY PRN
Qty: 30 TABLET | Refills: 1 | Status: SHIPPED | OUTPATIENT
Start: 2022-07-29

## 2022-07-29 RX ORDER — NEOMYCIN SULFATE, POLYMYXIN B SULFATE AND DEXAMETHASONE 3.5; 10000; 1 MG/ML; [USP'U]/ML; MG/ML
2 SUSPENSION/ DROPS OPHTHALMIC EVERY 6 HOURS
Qty: 5 ML | Refills: 0 | Status: SHIPPED | OUTPATIENT
Start: 2022-07-29 | End: 2022-08-03

## 2022-07-29 NOTE — PROGRESS NOTES
"[facility]  Chief Complaint      Chief Complaint   Patient presents with    Sinus Problem     Patient reports sneezing for a couple of days    Nasal Congestion    Runny Eyes       History of Present Illness      Heydi Ceja is a 82 y.o. female with chronic conditions of Depression, Insomnia, Hyperlipidemia, and Eczema  who presents today for nasal congestion and runny eyes for a few days. She was tested for Covid, Influenza A, and Influenza B all of which were negative on 07/28/2022. She reports using Pataday and OTC eye drops without any relief and has been using OTC saline solution.       Past Medical History:   Diagnosis Date    Depression     Eczema     Hyperlipidemia     Insomnia        Past Surgical History:   has no past surgical history on file.    Social History:  Social History     Tobacco Use    Smoking status: Never Smoker    Smokeless tobacco: Never Used   Substance Use Topics    Alcohol use: Never    Drug use: Never       I personally reviewed all past medical, surgical, and social.     Review of Systems   Constitutional: Negative for chills and fever.   HENT: Positive for congestion and rhinorrhea. Negative for postnasal drip and sore throat.    Eyes: Positive for redness and itching.   Respiratory: Negative for cough and shortness of breath.    Cardiovascular: Negative for chest pain.   Gastrointestinal: Positive for nausea. Negative for abdominal pain and diarrhea.   Genitourinary: Negative for dysuria, frequency and urgency.   Neurological: Positive for headaches ("slight headache today a pretty good one yesterday"). Negative for dizziness.        Medications:  Outpatient Encounter Medications as of 7/29/2022   Medication Sig Dispense Refill    levothyroxine (SYNTHROID) 125 MCG tablet Take 1 tablet (125 mcg total) by mouth before breakfast. 30 tablet 3    ondansetron (ZOFRAN) 4 MG tablet Take 1 tablet (4 mg total) by mouth 2 (two) times daily as needed for " "Nausea. 20 tablet 0    sertraline (ZOLOFT) 100 MG tablet Take 1.5 tablets (150 mg total) by mouth once daily. 135 tablet 1    traZODone (DESYREL) 150 MG tablet Take 1 tablet (150 mg total) by mouth nightly. 90 tablet 1    loratadine (CLARITIN) 10 mg tablet Take 1 tablet (10 mg total) by mouth daily as needed for Allergies. 30 tablet 1    neomycin-polymyxin-dexamethasone (MAXITROL) 3.5mg/mL-10,000 unit/mL-0.1 % DrpS Place 2 drops into both eyes every 6 (six) hours. for 5 days 5 mL 0     No facility-administered encounter medications on file as of 7/29/2022.       Allergies:  Review of patient's allergies indicates:  No Known Allergies    Health Maintenance:    There is no immunization history on file for this patient.   Health Maintenance   Topic Date Due    TETANUS VACCINE  Never done    DEXA Scan  Never done    Lipid Panel  09/10/2026        Physical Exam      Vital Signs  Temp: 97.6 °F (36.4 °C)  Pulse: 95  Resp: 20  SpO2: 95 %  BP: 102/70  BP Location: Left arm  Patient Position: Sitting  Height and Weight  Height: 5' 6" (167.6 cm)  Weight: 70.9 kg (156 lb 3.2 oz)  BSA (Calculated - sq m): 1.82 sq meters  BMI (Calculated): 25.2  Weight in (lb) to have BMI = 25: 154.6]    Physical Exam  Constitutional:       Appearance: Normal appearance.   Eyes:      Comments: Inflammation of bilateral upper eye lids, erythematous sclera bilateral eyes, clear tearing bilateral eyes.    Cardiovascular:      Rate and Rhythm: Normal rate and regular rhythm.      Pulses: Normal pulses.      Heart sounds: Normal heart sounds.   Pulmonary:      Effort: Pulmonary effort is normal.      Breath sounds: Normal breath sounds.   Skin:     General: Skin is warm and dry.   Neurological:      Mental Status: She is alert and oriented to person, place, and time.   Psychiatric:         Mood and Affect: Mood normal.         Behavior: Behavior normal.          Laboratory:  CBC:  Recent Labs   Lab 09/10/21  1633   WBC 6.71   RBC 4.70 "   Hemoglobin 13.6   Hematocrit 41.7   Platelet Count 194   MCV 88.7   MCH 28.9   MCHC 32.6     CMP:  Recent Labs   Lab 09/10/21  1633 06/13/22  1601   Glucose 98 93   Calcium 9.2 9.2   Albumin 4.0  --    Total Protein 7.5  --    Sodium 137 140   Potassium 4.0 4.1   CO2 29 26   Chloride 104 108 H   BUN 20 H 16   Alk Phos 113  --    ALT 24  --    AST 17  --    Bilirubin, Total 1.0  --      LIPIDS:  Recent Labs   Lab 09/10/21  1633 06/13/22  1601   TSH 1.220 0.176 L   HDL Cholesterol 52  --    Cholesterol 251 H  --    Triglycerides 143  --    LDL Calculated 170  --    Cholesterol/HDL Ratio (Risk Factor) 4.8  --    Non-  --      TSH:  Recent Labs   Lab 09/10/21  1633 06/13/22  1601   TSH 1.220 0.176 L     Point Of Care Testing:    Lab Results   Component Value Date    DWTFEIU6ET Negative 07/28/2022    RAPFLUA Negative 07/28/2022    RAPFLUB Negative 07/28/2022         Assessment/Plan     1. Allergic rhinitis, unspecified seasonality, unspecified trigger  - loratadine (CLARITIN) 10 mg tablet; Take 1 tablet (10 mg total) by mouth daily as needed for Allergies.  Dispense: 30 tablet; Refill: 1    2. Blepharitis of upper eyelids of both eyes, unspecified type  - neomycin-polymyxin-dexamethasone (MAXITROL) 3.5mg/mL-10,000 unit/mL-0.1 % DrpS; Place 2 drops into both eyes every 6 (six) hours. for 5 days  Dispense: 5 mL; Refill: 0    3. Conjunctivitis of both eyes, unspecified conjunctivitis type  - neomycin-polymyxin-dexamethasone (MAXITROL) 3.5mg/mL-10,000 unit/mL-0.1 % DrpS; Place 2 drops into both eyes every 6 (six) hours. for 5 days  Dispense: 5 mL; Refill: 0      Return to clinic as previously scheduled and as needed if symptoms persist, worsen, or new symptoms develop.     EVON [facility]

## 2022-08-01 ENCOUNTER — HOSPITAL ENCOUNTER (OUTPATIENT)
Dept: RADIOLOGY | Facility: HOSPITAL | Age: 82
Discharge: HOME | End: 2022-08-01
Attending: RADIOLOGY
Payer: MEDICARE

## 2022-08-01 ENCOUNTER — HOSPITAL ENCOUNTER (OUTPATIENT)
Dept: RADIOLOGY | Facility: HOSPITAL | Age: 82
Discharge: HOME | End: 2022-08-01
Attending: INTERNAL MEDICINE
Payer: MEDICARE

## 2022-08-01 VITALS — HEIGHT: 66 IN | WEIGHT: 152 LBS | BODY MASS INDEX: 24.43 KG/M2

## 2022-08-01 DIAGNOSIS — R92.8 OTHER ABNORMAL AND INCONCLUSIVE FINDINGS ON DIAGNOSTIC IMAGING OF BREAST: ICD-10-CM

## 2022-08-01 DIAGNOSIS — R92.8 ABNORMAL MAMMOGRAM: ICD-10-CM

## 2022-08-01 DIAGNOSIS — N63.0 BREAST LUMP: ICD-10-CM

## 2022-08-01 DIAGNOSIS — R93.89 ABNORMAL FINDING OF DIAGNOSTIC IMAGING: ICD-10-CM

## 2022-08-01 DIAGNOSIS — Z12.31 ENCOUNTER FOR SCREENING MAMMOGRAM FOR MALIGNANT NEOPLASM OF BREAST: ICD-10-CM

## 2022-08-01 DIAGNOSIS — N63.20 LEFT BREAST MASS: ICD-10-CM

## 2022-08-01 PROCEDURE — 19083 US BREAST BIOPSY WITH IMAGING 1ST SITE LEFT: ICD-10-PCS | Mod: LT,,, | Performed by: RADIOLOGY

## 2022-08-01 PROCEDURE — 77066 MAMMO DIGITAL DIAGNOSTIC BILAT: ICD-10-PCS | Mod: 26,,, | Performed by: RADIOLOGY

## 2022-08-01 PROCEDURE — 19083 BX BREAST 1ST LESION US IMAG: CPT | Mod: LT,,, | Performed by: RADIOLOGY

## 2022-08-01 PROCEDURE — 77066 DX MAMMO INCL CAD BI: CPT | Mod: 26,,, | Performed by: RADIOLOGY

## 2022-08-01 PROCEDURE — 19083 BX BREAST 1ST LESION US IMAG: CPT | Mod: LT

## 2022-08-01 PROCEDURE — 77066 DX MAMMO INCL CAD BI: CPT | Mod: TC

## 2022-08-01 PROCEDURE — 27200937 US BREAST BIOPSY WITH IMAGING 1ST SITE LEFT

## 2022-08-01 PROCEDURE — 76641 ULTRASOUND BREAST COMPLETE: CPT | Mod: TC,50

## 2022-08-01 PROCEDURE — 76641 ULTRASOUND BREAST COMPLETE: CPT | Mod: 26,50,, | Performed by: RADIOLOGY

## 2022-08-01 PROCEDURE — 76641 US BREAST BILATERAL COMPLETE: ICD-10-PCS | Mod: 26,50,, | Performed by: RADIOLOGY

## 2022-08-03 LAB
DHEA SERPL-MCNC: NORMAL
ESTROGEN SERPL-MCNC: NORMAL PG/ML
INSULIN SERPL-ACNC: NORMAL U[IU]/ML
LAB AP GROSS DESCRIPTION: NORMAL
LAB AP LABORATORY NOTES: NORMAL
LAB AP PREDICTIVE MARKER TESTING: NORMAL
T3RU NFR SERPL: NORMAL %

## 2022-08-05 ENCOUNTER — OFFICE VISIT (OUTPATIENT)
Dept: FAMILY MEDICINE | Facility: CLINIC | Age: 82
End: 2022-08-05
Payer: MEDICARE

## 2022-08-05 ENCOUNTER — TELEPHONE (OUTPATIENT)
Dept: FAMILY MEDICINE | Facility: CLINIC | Age: 82
End: 2022-08-05
Payer: MEDICARE

## 2022-08-05 VITALS
DIASTOLIC BLOOD PRESSURE: 66 MMHG | SYSTOLIC BLOOD PRESSURE: 125 MMHG | OXYGEN SATURATION: 95 % | HEIGHT: 66 IN | RESPIRATION RATE: 16 BRPM | BODY MASS INDEX: 24.43 KG/M2 | HEART RATE: 90 BPM | WEIGHT: 152 LBS | TEMPERATURE: 98 F

## 2022-08-05 DIAGNOSIS — J06.9 UPPER RESPIRATORY TRACT INFECTION, UNSPECIFIED TYPE: Primary | ICD-10-CM

## 2022-08-05 DIAGNOSIS — Z11.52 ENCOUNTER FOR SCREENING FOR COVID-19: ICD-10-CM

## 2022-08-05 LAB
CTP QC/QA: YES
SARS-COV-2 AG RESP QL IA.RAPID: NEGATIVE

## 2022-08-05 PROCEDURE — 99213 OFFICE O/P EST LOW 20 MIN: CPT | Mod: ,,, | Performed by: FAMILY MEDICINE

## 2022-08-05 PROCEDURE — 96372 THER/PROPH/DIAG INJ SC/IM: CPT | Mod: ,,, | Performed by: FAMILY MEDICINE

## 2022-08-05 PROCEDURE — 99213 PR OFFICE/OUTPT VISIT, EST, LEVL III, 20-29 MIN: ICD-10-PCS | Mod: ,,, | Performed by: FAMILY MEDICINE

## 2022-08-05 PROCEDURE — 96372 PR INJECTION,THERAP/PROPH/DIAG2ST, IM OR SUBCUT: ICD-10-PCS | Mod: ,,, | Performed by: FAMILY MEDICINE

## 2022-08-05 PROCEDURE — 87426 SARSCOV CORONAVIRUS AG IA: CPT | Mod: RHCUB | Performed by: FAMILY MEDICINE

## 2022-08-05 RX ORDER — AZITHROMYCIN 250 MG/1
TABLET, FILM COATED ORAL
Qty: 6 TABLET | Refills: 0 | Status: ON HOLD | OUTPATIENT
Start: 2022-08-05 | End: 2022-09-02 | Stop reason: HOSPADM

## 2022-08-05 RX ORDER — CEFTRIAXONE 1 G/1
1 INJECTION, POWDER, FOR SOLUTION INTRAMUSCULAR; INTRAVENOUS
Status: COMPLETED | OUTPATIENT
Start: 2022-08-05 | End: 2022-08-05

## 2022-08-05 RX ADMIN — CEFTRIAXONE 1 G: 1 INJECTION, POWDER, FOR SOLUTION INTRAMUSCULAR; INTRAVENOUS at 05:08

## 2022-08-05 NOTE — PROGRESS NOTES
Subjective:       Patient ID: Heydi Ceja is a 82 y.o. female.    Chief Complaint: Sinus Problem (Nasal congestion, HA, Scratchy throat, Denies any other symptoms. X 1 week. )    HPI  Review of Systems   Constitutional: Negative for activity change, appetite change, chills, diaphoresis, fatigue, fever and unexpected weight change.   HENT: Positive for nasal congestion, postnasal drip, rhinorrhea, sinus pressure/congestion and sore throat. Negative for dental problem, drooling, ear discharge, ear pain, facial swelling, hearing loss, mouth sores, nosebleeds, sneezing, tinnitus, trouble swallowing, voice change and goiter.    Eyes: Negative for photophobia, discharge, itching and visual disturbance.   Respiratory: Positive for cough. Negative for apnea, choking, chest tightness, shortness of breath, wheezing and stridor.    Cardiovascular: Negative for chest pain, palpitations, leg swelling and claudication.   Gastrointestinal: Negative for abdominal distention, abdominal pain, anal bleeding, blood in stool, change in bowel habit, constipation, diarrhea, nausea, vomiting and change in bowel habit.   Endocrine: Negative for cold intolerance, heat intolerance, polydipsia, polyphagia and polyuria.   Genitourinary: Negative for bladder incontinence, decreased urine volume, difficulty urinating, dysuria, enuresis, flank pain, frequency, hematuria, nocturia, pelvic pain and urgency.   Musculoskeletal: Negative for arthralgias, back pain, gait problem, joint swelling, leg pain, myalgias, neck pain, neck stiffness and joint deformity.   Integumentary:  Negative for pallor, rash, wound, breast mass and breast tenderness.   Allergic/Immunologic: Negative for environmental allergies, food allergies and immunocompromised state.   Neurological: Negative for dizziness, vertigo, tremors, seizures, syncope, facial asymmetry, speech difficulty, weakness, light-headedness, numbness, headaches, disturbances in coordination, memory  loss and coordination difficulties.   Hematological: Negative for adenopathy. Does not bruise/bleed easily.   Psychiatric/Behavioral: Negative for agitation, behavioral problems, confusion, decreased concentration, dysphoric mood, hallucinations, self-injury, sleep disturbance and suicidal ideas. The patient is not nervous/anxious and is not hyperactive.    Breast: Negative for mass and tenderness        Objective:      Physical Exam  Vitals reviewed.   Constitutional:       Appearance: Normal appearance.   HENT:      Head: Normocephalic and atraumatic.      Right Ear: Tympanic membrane, ear canal and external ear normal.      Left Ear: Tympanic membrane, ear canal and external ear normal.      Nose: Congestion and rhinorrhea present.      Mouth/Throat:      Mouth: Mucous membranes are moist.      Pharynx: Oropharynx is clear. Posterior oropharyngeal erythema present.   Eyes:      Extraocular Movements: Extraocular movements intact.      Conjunctiva/sclera: Conjunctivae normal.      Pupils: Pupils are equal, round, and reactive to light.   Cardiovascular:      Rate and Rhythm: Normal rate and regular rhythm.      Pulses: Normal pulses.      Heart sounds: Normal heart sounds.   Pulmonary:      Effort: Pulmonary effort is normal.      Breath sounds: Normal breath sounds.   Abdominal:      General: Bowel sounds are normal.      Palpations: Abdomen is soft.   Musculoskeletal:         General: Normal range of motion.      Cervical back: Normal range of motion and neck supple.   Skin:     General: Skin is warm and dry.   Neurological:      General: No focal deficit present.      Mental Status: She is alert. Mental status is at baseline.   Psychiatric:         Mood and Affect: Mood normal.         Behavior: Behavior normal.         Thought Content: Thought content normal.         Judgment: Judgment normal.         Assessment:       1. Upper respiratory tract infection, unspecified type    2. Encounter for screening for  COVID-19        Plan:     Upper respiratory tract infection, unspecified type  -     cefTRIAXone injection 1 g  -     azithromycin (Z-HEATHER) 250 MG tablet; Take 2 tablets by mouth on day 1; Take 1 tablet by mouth on days 2-5  Dispense: 6 tablet; Refill: 0  -     brompheniramin-phenylephrin-DM (RYNEX DM) 1-2.5-5 mg/5 mL Soln; Take 5 mLs by mouth every 4 (four) hours as needed (cough).  Dispense: 118 mL; Refill: 0    Encounter for screening for COVID-19  -     SARS Coronavirus 2 Antigen, POCT         Covid negative, treat for URI.

## 2022-08-05 NOTE — TELEPHONE ENCOUNTER
----- Message from Jose Rodriguez MD sent at 8/4/2022  9:51 AM CDT -----    Need to see asap       Attempted to call patient re: above message. No answer, left message for her to call back and make an appointment. Called other number on profile, Devante answered-says she will have patient call and make appointment for Monday.

## 2022-08-09 ENCOUNTER — OFFICE VISIT (OUTPATIENT)
Dept: SURGERY | Facility: CLINIC | Age: 82
End: 2022-08-09
Attending: SURGERY
Payer: MEDICARE

## 2022-08-09 DIAGNOSIS — N63.20 LEFT BREAST MASS: Primary | ICD-10-CM

## 2022-08-09 DIAGNOSIS — K80.20 GALLSTONES: ICD-10-CM

## 2022-08-09 PROCEDURE — 99215 OFFICE O/P EST HI 40 MIN: CPT | Mod: PBBFAC | Performed by: SURGERY

## 2022-08-09 PROCEDURE — 99205 OFFICE O/P NEW HI 60 MIN: CPT | Mod: S$PBB,,, | Performed by: SURGERY

## 2022-08-09 PROCEDURE — 99205 PR OFFICE/OUTPT VISIT, NEW, LEVL V, 60-74 MIN: ICD-10-PCS | Mod: S$PBB,,, | Performed by: SURGERY

## 2022-08-09 NOTE — PATIENT INSTRUCTIONS
[facility]                                                                                                                                                                                                                                                                                                                                                                                                                                                                         Preoperative Instructions        Your pre-op lab work and Covid testing will be on 8/16/22 on the 1st floor of the [facility] building.  EKG is located on 2nd floor of [facility].    You will need to sign in at [facility] on 8/16/22 at 12:30        YOU MUST QUARANTINE FROM TIME OF COVID TESTING UNTIL SURGERY                                                                                  Day of Surgery      Your surgery is scheduled for 8/17/22 at [facility] on the ground floor of the Ambulatory building.     Your arrival time is 6:00.              DO NOT EAT OR DRINK ANYTHING AFTER MIDNIGHT.          You may take blood pressure medication with a small drink of water the morning of surgery.                                 DO NOT TAKE INSULIN OR ANY OTHER BLOOD SUGAR MEDICATIONS.           The following blood sugar medications have to be stopped 48 hours prior to surgery:                    Metformin        Glucovance          Metaglip             Fortamet           Glucophage                   Riomet             Avandamet          Glimepiride              IF YOU ARE ON ANY OF THESE BLOOD THINNERS, MAKE SURE YOUR PHYSICIAN IS AWARE.                Eliquis/Apixaban             Xarelto/Rivaroxaban             Plavix/Clopidogrel                   Wafarin/Coumadin,Jantoven           Pletal/Cilostazol              Pradaxa/Dibigatran                           PLEASE USE CHLORHEXIDINE WASH THE NIGHT BEFORE SURGERY AND THE MORNING OF SURGERY.             YOU CANNOT DRIVE YOURSELF HOME FROM THE HOSPITAL THE DAY OF SURGERY.        Please have a  with you.           Bring all medications, that you are currently taking, with you to the hospital the morning of your procedure.           Please leave all valuables at home.          Children under the age of 18 must be accompanied by an adult.          PLEASE UNDERSTAND THAT OUR OFFICE DOES NOT GIVE PATHOLOGY RESULTS OR TEST RESULTS OVER THE PHONE.         THIS WILL BE DISCUSSED WITH YOU ON YOUR FOLLOW UP APPOINTMENT TO DISCUSS IN PERSON.

## 2022-08-13 ENCOUNTER — OFFICE VISIT (OUTPATIENT)
Dept: FAMILY MEDICINE | Facility: CLINIC | Age: 82
End: 2022-08-13
Payer: MEDICARE

## 2022-08-13 ENCOUNTER — APPOINTMENT (OUTPATIENT)
Dept: RADIOLOGY | Facility: CLINIC | Age: 82
End: 2022-08-13
Attending: FAMILY MEDICINE
Payer: MEDICARE

## 2022-08-13 VITALS
WEIGHT: 154 LBS | HEART RATE: 107 BPM | RESPIRATION RATE: 16 BRPM | TEMPERATURE: 98 F | BODY MASS INDEX: 24.75 KG/M2 | HEIGHT: 66 IN | DIASTOLIC BLOOD PRESSURE: 69 MMHG | SYSTOLIC BLOOD PRESSURE: 137 MMHG | OXYGEN SATURATION: 96 %

## 2022-08-13 DIAGNOSIS — R05.9 COUGH: Primary | ICD-10-CM

## 2022-08-13 DIAGNOSIS — R05.9 COUGH: ICD-10-CM

## 2022-08-13 PROCEDURE — 99213 OFFICE O/P EST LOW 20 MIN: CPT | Mod: ,,, | Performed by: FAMILY MEDICINE

## 2022-08-13 PROCEDURE — 70220 X-RAY EXAM OF SINUSES: CPT | Mod: 26,,, | Performed by: RADIOLOGY

## 2022-08-13 PROCEDURE — 99213 PR OFFICE/OUTPT VISIT, EST, LEVL III, 20-29 MIN: ICD-10-PCS | Mod: ,,, | Performed by: FAMILY MEDICINE

## 2022-08-13 PROCEDURE — 70220 X-RAY EXAM OF SINUSES: CPT | Mod: TC,RHCUB | Performed by: FAMILY MEDICINE

## 2022-08-13 PROCEDURE — 70220 XR SINUSES MIN 3 VIEWS: ICD-10-PCS | Mod: 26,,, | Performed by: RADIOLOGY

## 2022-08-13 RX ORDER — AZELASTINE 1 MG/ML
1 SPRAY, METERED NASAL 2 TIMES DAILY
Qty: 30 ML | Refills: 5 | Status: SHIPPED | OUTPATIENT
Start: 2022-08-13 | End: 2024-01-02 | Stop reason: SDUPTHER

## 2022-08-13 RX ORDER — AMOXICILLIN AND CLAVULANATE POTASSIUM 875; 125 MG/1; MG/1
1 TABLET, FILM COATED ORAL EVERY 12 HOURS
Qty: 20 TABLET | Refills: 0 | Status: ON HOLD | OUTPATIENT
Start: 2022-08-13 | End: 2022-09-02 | Stop reason: HOSPADM

## 2022-08-13 NOTE — PROGRESS NOTES
Subjective:       Patient ID: Heydi Cjea is a 82 y.o. female.    Chief Complaint: Nasal Congestion (X 1 month, pt states medication and shot is not working. Can not get rid of nasal congestion.) and Eczema (Complaints of eczema on right arm)    Patient was here ache days ago with similar symptoms.  Treated with Zithromax and decongestant.  No fever.  She still still slight she has some chest congestion.  Coughing up a little yellow sputum.  No fever.  Nasal congestion but no headache.  She has surgery scheduled in 4 days.    Review of Systems      Objective:      Physical Exam  Constitutional:       General: She is not in acute distress.  HENT:      Nose: Congestion and rhinorrhea present.      Mouth/Throat:      Pharynx: No posterior oropharyngeal erythema.   Cardiovascular:      Rate and Rhythm: Normal rate.   Pulmonary:      Effort: Pulmonary effort is normal.      Breath sounds: Normal breath sounds.   Neurological:      Mental Status: She is alert.         Assessment:       Problem List Items Addressed This Visit    None         Plan:     sinus films generally clear.  Some mucosal thickening but no air-fluid level.  Chest x-ray is clear will begin Augmentin.    patient will contact her surgery to let him know about her current symptoms.

## 2022-08-14 PROBLEM — K80.20 GALLSTONES: Status: ACTIVE | Noted: 2022-08-14

## 2022-08-14 PROBLEM — N63.20 LEFT BREAST MASS: Status: ACTIVE | Noted: 2022-08-14

## 2022-08-14 NOTE — ASSESSMENT & PLAN NOTE
Surgical options presented to patient  She chooses breast conservation, including sentinel node biopsy, with radiation  Risks and benefits of breast surgery discussed to include bleeding, hematoma, seroma, neuralgia, lymphedema, possible need for further surgery. She knows she is comitted to postop radiation (30 treatments), and might need chemo, in addition to need for hormone therapy.    She understands and wished to proceed.

## 2022-08-14 NOTE — PROGRESS NOTES
Subjective:       Patient ID: Heydi Ceja is a 82 y.o. female.    Chief Complaint: Mass (Left breast mass)    83 y/o female patient who presents with a history of palpable mass in left breast.  She had an u/s and mammogram recently, for further evaluation.  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  She is referred for definitive treatment.    In addition to above, patient has been having nausea and RUQ pain.  She had an u/s revealing gallstones, and wishes to have gallbladder surgery while she is under anesthesia for breast cancer management.    Mass  Pertinent negatives include no abdominal pain, chest pain, coughing, fever, numbness, rash or sore throat.       family history includes Breast cancer in her paternal aunt.  Past Medical History:   Diagnosis Date    Depression     Eczema     Hyperlipidemia     Insomnia       Past Surgical History:   Procedure Laterality Date    HYSTERECTOMY         reports that she has never smoked. She has never used smokeless tobacco. She reports that she does not drink alcohol and does not use drugs.     Review of Systems   Constitutional: Negative for appetite change and fever.   HENT: Negative for sore throat. Hearing loss: cough.    Eyes: Negative for visual disturbance.   Respiratory: Negative for cough and shortness of breath.    Cardiovascular: Negative for chest pain.   Gastrointestinal: Negative for abdominal pain.   Endocrine: Negative.    Genitourinary: Negative.    Musculoskeletal: Negative.    Skin: Negative for rash and wound.   Neurological: Negative for numbness.   Hematological: Negative.    Psychiatric/Behavioral: Negative.           Objective:      There were no vitals taken for this visit.   Physical Exam  Cardiovascular:      Rate and Rhythm: Normal rate.      Pulses: Normal pulses.   Pulmonary:      Effort: Pulmonary effort is normal. No respiratory distress.   Chest:   Breasts:      Left: Mass present.             Comments: Upper outer quadrant with 3 cm mass palpble  Abdominal:      Palpations: Abdomen is soft.   Neurological:      General: No focal deficit present.      Mental Status: She is alert.           Assessment/Plan:         Left breast mass  -     Ambulatory referral/consult to General Surgery  -     NM Lymphatics And Lymph Node Imaging; Future; Expected date: 08/09/2022  -     EKG 12-lead; Future; Expected date: 08/14/2022  -     Comprehensive Metabolic Panel; Future; Expected date: 08/14/2022  -     CBC Auto Differential; Future; Expected date: 08/09/2022  -     Case Request Operating Room: BIOPSY, BREAST, WITH LUMPECTOMY, CHOLECYSTECTOMY, LAPAROSCOPIC    Gallstones  -     Ambulatory referral/consult to General Surgery  -     NM Lymphatics And Lymph Node Imaging; Future; Expected date: 08/09/2022  -     EKG 12-lead; Future; Expected date: 08/14/2022  -     Comprehensive Metabolic Panel; Future; Expected date: 08/14/2022  -     CBC Auto Differential; Future; Expected date: 08/09/2022  -     Case Request Operating Room: BIOPSY, BREAST, WITH LUMPECTOMY, CHOLECYSTECTOMY, LAPAROSCOPIC         Problem List Items Addressed This Visit        Renal/    Left breast mass - Primary    Overview     Left upper outer quadrant; trucut core Biopsy by Dr. Gomez: + for invasive ductal carcinoma           Current Assessment & Plan     Surgical options presented to patient  She chooses breast conservation, including sentinel node biopsy, with radiation  Risks and benefits of breast surgery discussed to include bleeding, hematoma, seroma, neuralgia, lymphedema, possible need for further surgery. She knows she is comitted to postop radiation (30 treatments), and might need chemo, in addition to need for hormone therapy.    She understands and wished to proceed.           Relevant Orders    NM Lymphatics And Lymph Node Imaging    EKG 12-lead    Comprehensive Metabolic Panel    CBC Auto Differential    Case Request Operating  Room: BIOPSY, BREAST, WITH LUMPECTOMY, CHOLECYSTECTOMY, LAPAROSCOPIC (Completed)       GI    Gallstones    Current Assessment & Plan     Risks and benefits of surgery discussed to include infection, bleeding, hernia, possible drain, duct injury, retained stones, open conversion, possible need for postop ERCP or further surgery, and unforeseen.  Patient expresses understanding and wishes to proceed.           Relevant Orders    NM Lymphatics And Lymph Node Imaging    EKG 12-lead    Comprehensive Metabolic Panel    CBC Auto Differential    Case Request Operating Room: BIOPSY, BREAST, WITH LUMPECTOMY, CHOLECYSTECTOMY, LAPAROSCOPIC (Completed)

## 2022-08-14 NOTE — H&P (VIEW-ONLY)
Subjective:       Patient ID: Heydi Ceja is a 82 y.o. female.    Chief Complaint: Mass (Left breast mass)    81 y/o female patient who presents with a history of palpable mass in left breast.  She had an u/s and mammogram recently, for further evaluation.  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  She is referred for definitive treatment.    In addition to above, patient has been having nausea and RUQ pain.  She had an u/s revealing gallstones, and wishes to have gallbladder surgery while she is under anesthesia for breast cancer management.    Mass  Pertinent negatives include no abdominal pain, chest pain, coughing, fever, numbness, rash or sore throat.       family history includes Breast cancer in her paternal aunt.  Past Medical History:   Diagnosis Date    Depression     Eczema     Hyperlipidemia     Insomnia       Past Surgical History:   Procedure Laterality Date    HYSTERECTOMY         reports that she has never smoked. She has never used smokeless tobacco. She reports that she does not drink alcohol and does not use drugs.     Review of Systems   Constitutional: Negative for appetite change and fever.   HENT: Negative for sore throat. Hearing loss: cough.    Eyes: Negative for visual disturbance.   Respiratory: Negative for cough and shortness of breath.    Cardiovascular: Negative for chest pain.   Gastrointestinal: Negative for abdominal pain.   Endocrine: Negative.    Genitourinary: Negative.    Musculoskeletal: Negative.    Skin: Negative for rash and wound.   Neurological: Negative for numbness.   Hematological: Negative.    Psychiatric/Behavioral: Negative.           Objective:      There were no vitals taken for this visit.   Physical Exam  Cardiovascular:      Rate and Rhythm: Normal rate.      Pulses: Normal pulses.   Pulmonary:      Effort: Pulmonary effort is normal. No respiratory distress.   Chest:   Breasts:      Left: Mass present.             Comments: Upper outer quadrant with 3 cm mass palpble  Abdominal:      Palpations: Abdomen is soft.   Neurological:      General: No focal deficit present.      Mental Status: She is alert.           Assessment/Plan:         Left breast mass  -     Ambulatory referral/consult to General Surgery  -     NM Lymphatics And Lymph Node Imaging; Future; Expected date: 08/09/2022  -     EKG 12-lead; Future; Expected date: 08/14/2022  -     Comprehensive Metabolic Panel; Future; Expected date: 08/14/2022  -     CBC Auto Differential; Future; Expected date: 08/09/2022  -     Case Request Operating Room: BIOPSY, BREAST, WITH LUMPECTOMY, CHOLECYSTECTOMY, LAPAROSCOPIC    Gallstones  -     Ambulatory referral/consult to General Surgery  -     NM Lymphatics And Lymph Node Imaging; Future; Expected date: 08/09/2022  -     EKG 12-lead; Future; Expected date: 08/14/2022  -     Comprehensive Metabolic Panel; Future; Expected date: 08/14/2022  -     CBC Auto Differential; Future; Expected date: 08/09/2022  -     Case Request Operating Room: BIOPSY, BREAST, WITH LUMPECTOMY, CHOLECYSTECTOMY, LAPAROSCOPIC         Problem List Items Addressed This Visit        Renal/    Left breast mass - Primary    Overview     Left upper outer quadrant; trucut core Biopsy by Dr. Gomez: + for invasive ductal carcinoma           Current Assessment & Plan     Surgical options presented to patient  She chooses breast conservation, including sentinel node biopsy, with radiation  Risks and benefits of breast surgery discussed to include bleeding, hematoma, seroma, neuralgia, lymphedema, possible need for further surgery. She knows she is comitted to postop radiation (30 treatments), and might need chemo, in addition to need for hormone therapy.    She understands and wished to proceed.           Relevant Orders    NM Lymphatics And Lymph Node Imaging    EKG 12-lead    Comprehensive Metabolic Panel    CBC Auto Differential    Case Request Operating  Room: BIOPSY, BREAST, WITH LUMPECTOMY, CHOLECYSTECTOMY, LAPAROSCOPIC (Completed)       GI    Gallstones    Current Assessment & Plan     Risks and benefits of surgery discussed to include infection, bleeding, hernia, possible drain, duct injury, retained stones, open conversion, possible need for postop ERCP or further surgery, and unforeseen.  Patient expresses understanding and wishes to proceed.           Relevant Orders    NM Lymphatics And Lymph Node Imaging    EKG 12-lead    Comprehensive Metabolic Panel    CBC Auto Differential    Case Request Operating Room: BIOPSY, BREAST, WITH LUMPECTOMY, CHOLECYSTECTOMY, LAPAROSCOPIC (Completed)

## 2022-08-14 NOTE — ASSESSMENT & PLAN NOTE
Risks and benefits of surgery discussed to include infection, bleeding, hernia, possible drain, duct injury, retained stones, open conversion, possible need for postop ERCP or further surgery, and unforeseen.  Patient expresses understanding and wishes to proceed.

## 2022-08-17 ENCOUNTER — OFFICE VISIT (OUTPATIENT)
Dept: FAMILY MEDICINE | Facility: CLINIC | Age: 82
End: 2022-08-17
Payer: MEDICARE

## 2022-08-17 VITALS
HEART RATE: 111 BPM | TEMPERATURE: 98 F | RESPIRATION RATE: 18 BRPM | BODY MASS INDEX: 24.91 KG/M2 | HEIGHT: 66 IN | DIASTOLIC BLOOD PRESSURE: 89 MMHG | SYSTOLIC BLOOD PRESSURE: 142 MMHG | WEIGHT: 155 LBS | OXYGEN SATURATION: 97 %

## 2022-08-17 DIAGNOSIS — J32.9 SINUSITIS, UNSPECIFIED CHRONICITY, UNSPECIFIED LOCATION: Primary | ICD-10-CM

## 2022-08-17 PROCEDURE — 99214 OFFICE O/P EST MOD 30 MIN: CPT | Mod: ,,, | Performed by: FAMILY MEDICINE

## 2022-08-17 PROCEDURE — 99214 PR OFFICE/OUTPT VISIT, EST, LEVL IV, 30-39 MIN: ICD-10-PCS | Mod: ,,, | Performed by: FAMILY MEDICINE

## 2022-08-17 RX ORDER — DEXAMETHASONE SODIUM PHOSPHATE 4 MG/ML
3 INJECTION, SOLUTION INTRA-ARTICULAR; INTRALESIONAL; INTRAMUSCULAR; INTRAVENOUS; SOFT TISSUE
Status: DISCONTINUED | OUTPATIENT
Start: 2022-08-17 | End: 2022-08-17

## 2022-08-17 RX ORDER — LEVOFLOXACIN 500 MG/1
500 TABLET, FILM COATED ORAL DAILY
Qty: 7 TABLET | Refills: 0 | Status: SHIPPED | OUTPATIENT
Start: 2022-08-17 | End: 2022-08-24

## 2022-08-17 RX ORDER — PREDNISONE 10 MG/1
10 TABLET ORAL DAILY
Qty: 5 TABLET | Refills: 0 | Status: SHIPPED | OUTPATIENT
Start: 2022-08-17 | End: 2022-08-22

## 2022-08-17 NOTE — PROGRESS NOTES
Subjective:       Patient ID: Heydi Ceja is a 82 y.o. female.    Chief Complaint: Nasal Congestion (Nasal congestion since 7/29/2022/)    Pt with continued sinus pressure, congestion, drainage. Pt was on abx and rynex without relief. Pt reports she gains weight on steroids, no other side effects reported for steroid use.     Review of Systems   Constitutional: Negative for activity change, appetite change, chills, diaphoresis, fatigue, fever and unexpected weight change.   HENT: Positive for nasal congestion, postnasal drip, rhinorrhea and sinus pressure/congestion. Negative for dental problem, drooling, ear discharge, ear pain, facial swelling, hearing loss, mouth sores, nosebleeds, sneezing, sore throat, tinnitus, trouble swallowing, voice change and goiter.    Eyes: Negative for photophobia, discharge, itching and visual disturbance.   Respiratory: Negative for apnea, cough, choking, chest tightness, shortness of breath, wheezing and stridor.    Cardiovascular: Negative for chest pain, palpitations, leg swelling and claudication.   Gastrointestinal: Negative for abdominal distention, abdominal pain, anal bleeding, blood in stool, change in bowel habit, constipation, diarrhea, nausea, vomiting and change in bowel habit.   Endocrine: Negative for cold intolerance, heat intolerance, polydipsia, polyphagia and polyuria.   Genitourinary: Negative for bladder incontinence, decreased urine volume, difficulty urinating, dysuria, enuresis, flank pain, frequency, hematuria, nocturia, pelvic pain and urgency.   Musculoskeletal: Negative for arthralgias, back pain, gait problem, joint swelling, leg pain, myalgias, neck pain, neck stiffness and joint deformity.   Integumentary:  Negative for pallor, rash, wound, breast mass and breast tenderness.   Allergic/Immunologic: Negative for environmental allergies, food allergies and immunocompromised state.   Neurological: Negative for dizziness, vertigo, tremors, seizures,  syncope, facial asymmetry, speech difficulty, weakness, light-headedness, numbness, headaches, disturbances in coordination, memory loss and coordination difficulties.   Hematological: Negative for adenopathy. Does not bruise/bleed easily.   Psychiatric/Behavioral: Negative for agitation, behavioral problems, confusion, decreased concentration, dysphoric mood, hallucinations, self-injury, sleep disturbance and suicidal ideas. The patient is not nervous/anxious and is not hyperactive.    Breast: Negative for mass and tenderness        Objective:      Physical Exam  Vitals reviewed.   Constitutional:       Appearance: Normal appearance.   HENT:      Head: Normocephalic and atraumatic.      Right Ear: Tympanic membrane, ear canal and external ear normal.      Left Ear: Tympanic membrane, ear canal and external ear normal.      Nose: Congestion and rhinorrhea present.      Comments: Bilateral sinus pressure.      Mouth/Throat:      Mouth: Mucous membranes are moist.      Pharynx: Oropharynx is clear. Posterior oropharyngeal erythema present.   Eyes:      Extraocular Movements: Extraocular movements intact.      Conjunctiva/sclera: Conjunctivae normal.      Pupils: Pupils are equal, round, and reactive to light.   Cardiovascular:      Rate and Rhythm: Normal rate and regular rhythm.      Pulses: Normal pulses.      Heart sounds: Normal heart sounds.   Pulmonary:      Effort: Pulmonary effort is normal.      Breath sounds: Normal breath sounds.   Abdominal:      General: Bowel sounds are normal.      Palpations: Abdomen is soft.   Musculoskeletal:         General: Normal range of motion.      Cervical back: Normal range of motion and neck supple.   Skin:     General: Skin is warm and dry.   Neurological:      General: No focal deficit present.      Mental Status: She is alert. Mental status is at baseline.   Psychiatric:         Mood and Affect: Mood normal.         Behavior: Behavior normal.         Thought Content:  Thought content normal.         Judgment: Judgment normal.         Assessment:       1. Sinusitis, unspecified chronicity, unspecified location        Plan:     Sinusitis, unspecified chronicity, unspecified location  -     dexamethasone injection 3 mg  -     levoFLOXacin (LEVAQUIN) 500 MG tablet; Take 1 tablet (500 mg total) by mouth once daily. for 7 days  Dispense: 7 tablet; Refill: 0  -     predniSONE (DELTASONE) 10 MG tablet; Take 1 tablet (10 mg total) by mouth once daily. for 5 days  Dispense: 5 tablet; Refill: 0       Will switch abx, will add low dose steroids, will setup with ENT if symptoms persist.

## 2022-08-29 ENCOUNTER — CLINICAL SUPPORT (OUTPATIENT)
Dept: CARDIOLOGY | Facility: CLINIC | Age: 82
End: 2022-08-29
Attending: SURGERY
Payer: MEDICARE

## 2022-08-29 DIAGNOSIS — K80.20 GALLSTONES: ICD-10-CM

## 2022-08-29 DIAGNOSIS — N63.20 LEFT BREAST MASS: ICD-10-CM

## 2022-08-29 PROCEDURE — 93005 ELECTROCARDIOGRAM TRACING: CPT | Mod: PBBFAC | Performed by: INTERNAL MEDICINE

## 2022-08-29 PROCEDURE — 99212 OFFICE O/P EST SF 10 MIN: CPT | Mod: PBBFAC

## 2022-08-29 PROCEDURE — 93010 EKG 12-LEAD: ICD-10-PCS | Mod: S$PBB,,, | Performed by: INTERNAL MEDICINE

## 2022-08-29 PROCEDURE — 93010 ELECTROCARDIOGRAM REPORT: CPT | Mod: S$PBB,,, | Performed by: INTERNAL MEDICINE

## 2022-08-30 ENCOUNTER — HOSPITAL ENCOUNTER (OUTPATIENT)
Dept: RADIOLOGY | Facility: HOSPITAL | Age: 82
Discharge: HOME | End: 2022-08-30
Attending: SURGERY
Payer: MEDICARE

## 2022-08-30 DIAGNOSIS — K80.20 GALLSTONES: ICD-10-CM

## 2022-08-30 DIAGNOSIS — N63.20 LEFT BREAST MASS: ICD-10-CM

## 2022-08-30 PROCEDURE — 78195 NM LYMPHATICS AND LYMPH NODE IMAGING: ICD-10-PCS | Mod: 26,,, | Performed by: RADIOLOGY

## 2022-08-30 PROCEDURE — A9541 TC99M SULFUR COLLOID: HCPCS

## 2022-08-30 PROCEDURE — 78195 LYMPH SYSTEM IMAGING: CPT | Mod: 26,,, | Performed by: RADIOLOGY

## 2022-08-30 NOTE — PROCEDURES
Lymphoscintography of left breast performed by Carmine LOCKHART supervised by Dr. Dasilva. Patient prepped with chlorprep and draped in a sterile manner. With a 25g needle 500uCi T99m of filtered sulphur colloid infused. 3 Injections given periaereolar at 12,4, and 8 oclock. Patient tolerated procedure well with no immediate complications and imaging was began.

## 2022-08-30 NOTE — PROGRESS NOTES
IR consulted for NM lymphoscintography of left breast. H and P reviewed. Informed consent obtained.

## 2022-08-31 ENCOUNTER — ANESTHESIA (OUTPATIENT)
Dept: SURGERY | Facility: HOSPITAL | Age: 82
End: 2022-08-31
Payer: MEDICARE

## 2022-08-31 ENCOUNTER — HOSPITAL ENCOUNTER (OUTPATIENT)
Facility: HOSPITAL | Age: 82
Discharge: HOME | End: 2022-09-03
Attending: SURGERY | Admitting: SURGERY
Payer: MEDICARE

## 2022-08-31 ENCOUNTER — ANESTHESIA EVENT (OUTPATIENT)
Dept: SURGERY | Facility: HOSPITAL | Age: 82
End: 2022-08-31
Payer: MEDICARE

## 2022-08-31 DIAGNOSIS — K80.20 GALLSTONES: ICD-10-CM

## 2022-08-31 DIAGNOSIS — N63.20 LEFT BREAST MASS: Primary | ICD-10-CM

## 2022-08-31 LAB
ALBUMIN SERPL BCP-MCNC: 4 G/DL (ref 3.5–5)
ALBUMIN/GLOB SERPL: 1.5 {RATIO}
ALP SERPL-CCNC: 92 U/L (ref 55–142)
ALT SERPL W P-5'-P-CCNC: 21 U/L (ref 13–56)
ANION GAP SERPL CALCULATED.3IONS-SCNC: 12 MMOL/L (ref 7–16)
AST SERPL W P-5'-P-CCNC: 15 U/L (ref 15–37)
BASOPHILS # BLD AUTO: 0.03 K/UL (ref 0–0.2)
BASOPHILS NFR BLD AUTO: 0.4 % (ref 0–1)
BILIRUB SERPL-MCNC: 0.9 MG/DL (ref ?–1.2)
BUN SERPL-MCNC: 15 MG/DL (ref 7–18)
BUN/CREAT SERPL: 17 (ref 6–20)
CALCIUM SERPL-MCNC: 9.2 MG/DL (ref 8.5–10.1)
CHLORIDE SERPL-SCNC: 104 MMOL/L (ref 98–107)
CO2 SERPL-SCNC: 26 MMOL/L (ref 21–32)
CREAT SERPL-MCNC: 0.89 MG/DL (ref 0.55–1.02)
DIFFERENTIAL METHOD BLD: ABNORMAL
EGFR (NO RACE VARIABLE) (RUSH/TITUS): 65 ML/MIN/1.73M²
EOSINOPHIL # BLD AUTO: 0.56 K/UL (ref 0–0.5)
EOSINOPHIL NFR BLD AUTO: 8.4 % (ref 1–4)
ERYTHROCYTE [DISTWIDTH] IN BLOOD BY AUTOMATED COUNT: 13.6 % (ref 11.5–14.5)
GLOBULIN SER-MCNC: 2.7 G/DL (ref 2–4)
GLUCOSE SERPL-MCNC: 107 MG/DL (ref 74–106)
HCT VFR BLD AUTO: 38.4 % (ref 38–47)
HGB BLD-MCNC: 13.2 G/DL (ref 12–16)
IMM GRANULOCYTES # BLD AUTO: 0.02 K/UL (ref 0–0.04)
IMM GRANULOCYTES NFR BLD: 0.3 % (ref 0–0.4)
LYMPHOCYTES # BLD AUTO: 1.31 K/UL (ref 1–4.8)
LYMPHOCYTES NFR BLD AUTO: 19.6 % (ref 27–41)
MCH RBC QN AUTO: 30.1 PG (ref 27–31)
MCHC RBC AUTO-ENTMCNC: 34.4 G/DL (ref 32–36)
MCV RBC AUTO: 87.7 FL (ref 80–96)
MONOCYTES # BLD AUTO: 0.5 K/UL (ref 0–0.8)
MONOCYTES NFR BLD AUTO: 7.5 % (ref 2–6)
MPC BLD CALC-MCNC: 9.4 FL (ref 9.4–12.4)
NEUTROPHILS # BLD AUTO: 4.27 K/UL (ref 1.8–7.7)
NEUTROPHILS NFR BLD AUTO: 63.8 % (ref 53–65)
NRBC # BLD AUTO: 0 X10E3/UL
NRBC, AUTO (.00): 0 %
PLATELET # BLD AUTO: 153 K/UL (ref 150–400)
POTASSIUM SERPL-SCNC: 4.1 MMOL/L (ref 3.5–5.1)
PROT SERPL-MCNC: 6.7 G/DL (ref 6.4–8.2)
RBC # BLD AUTO: 4.38 M/UL (ref 4.2–5.4)
SODIUM SERPL-SCNC: 138 MMOL/L (ref 136–145)
WBC # BLD AUTO: 6.69 K/UL (ref 4.5–11)

## 2022-08-31 PROCEDURE — 27000716 HC OXISENSOR PROBE, ANY SIZE: Performed by: ANESTHESIOLOGY

## 2022-08-31 PROCEDURE — 47562 LAPAROSCOPIC CHOLECYSTECTOMY: CPT | Mod: ,,, | Performed by: SURGERY

## 2022-08-31 PROCEDURE — 27000165 HC TUBE, ETT CUFFED: Performed by: ANESTHESIOLOGY

## 2022-08-31 PROCEDURE — 80053 COMPREHEN METABOLIC PANEL: CPT | Performed by: SURGERY

## 2022-08-31 PROCEDURE — 25000003 PHARM REV CODE 250: Performed by: SURGERY

## 2022-08-31 PROCEDURE — 38900 IO MAP OF SENT LYMPH NODE: CPT | Mod: LT,,, | Performed by: SURGERY

## 2022-08-31 PROCEDURE — D9220A PRA ANESTHESIA: ICD-10-PCS | Mod: CRNA,,,

## 2022-08-31 PROCEDURE — 27000510 HC BLANKET BAIR HUGGER ANY SIZE: Performed by: ANESTHESIOLOGY

## 2022-08-31 PROCEDURE — 71000016 HC POSTOP RECOV ADDL HR: Performed by: SURGERY

## 2022-08-31 PROCEDURE — D9220A PRA ANESTHESIA: Mod: CRNA,,,

## 2022-08-31 PROCEDURE — 94761 N-INVAS EAR/PLS OXIMETRY MLT: CPT

## 2022-08-31 PROCEDURE — 63600175 PHARM REV CODE 636 W HCPCS: Performed by: ANESTHESIOLOGY

## 2022-08-31 PROCEDURE — 19302 P-MASTECTOMY W/LN REMOVAL: CPT | Mod: LT,,, | Performed by: SURGERY

## 2022-08-31 PROCEDURE — 38900 PR INTRAOPERATIVE SENTINEL LYMPH NODE ID W DYE INJECTION: ICD-10-PCS | Mod: LT,,, | Performed by: SURGERY

## 2022-08-31 PROCEDURE — 85025 COMPLETE CBC W/AUTO DIFF WBC: CPT | Performed by: SURGERY

## 2022-08-31 PROCEDURE — 63600175 PHARM REV CODE 636 W HCPCS: Mod: JW | Performed by: SURGERY

## 2022-08-31 PROCEDURE — 27000655: Performed by: ANESTHESIOLOGY

## 2022-08-31 PROCEDURE — 19302 PR MASTECTOMY,PARTIAL,  WITH AXILLARY LYMPHADENECTOMY: ICD-10-PCS | Mod: LT,,, | Performed by: SURGERY

## 2022-08-31 PROCEDURE — 96360 HYDRATION IV INFUSION INIT: CPT | Mod: 59

## 2022-08-31 PROCEDURE — 37000009 HC ANESTHESIA EA ADD 15 MINS: Performed by: SURGERY

## 2022-08-31 PROCEDURE — 25000003 PHARM REV CODE 250

## 2022-08-31 PROCEDURE — 37000008 HC ANESTHESIA 1ST 15 MINUTES: Performed by: SURGERY

## 2022-08-31 PROCEDURE — 63600175 PHARM REV CODE 636 W HCPCS: Performed by: NURSE ANESTHETIST, CERTIFIED REGISTERED

## 2022-08-31 PROCEDURE — 36000708 HC OR TIME LEV III 1ST 15 MIN: Performed by: SURGERY

## 2022-08-31 PROCEDURE — 27000689 HC BLADE LARYNGOSCOPE ANY SIZE: Performed by: ANESTHESIOLOGY

## 2022-08-31 PROCEDURE — 36415 COLL VENOUS BLD VENIPUNCTURE: CPT | Performed by: SURGERY

## 2022-08-31 PROCEDURE — G0378 HOSPITAL OBSERVATION PER HR: HCPCS

## 2022-08-31 PROCEDURE — 99900035 HC TECH TIME PER 15 MIN (STAT)

## 2022-08-31 PROCEDURE — 47562 PR LAP,CHOLECYSTECTOMY: ICD-10-PCS | Mod: ,,, | Performed by: SURGERY

## 2022-08-31 PROCEDURE — D9220A PRA ANESTHESIA: Mod: ANES,,, | Performed by: ANESTHESIOLOGY

## 2022-08-31 PROCEDURE — 27201423 OPTIME MED/SURG SUP & DEVICES STERILE SUPPLY: Performed by: SURGERY

## 2022-08-31 PROCEDURE — 71000033 HC RECOVERY, INTIAL HOUR: Performed by: SURGERY

## 2022-08-31 PROCEDURE — 71000015 HC POSTOP RECOV 1ST HR: Performed by: SURGERY

## 2022-08-31 PROCEDURE — 36000709 HC OR TIME LEV III EA ADD 15 MIN: Performed by: SURGERY

## 2022-08-31 PROCEDURE — 25000003 PHARM REV CODE 250: Performed by: NURSE ANESTHETIST, CERTIFIED REGISTERED

## 2022-08-31 PROCEDURE — D9220A PRA ANESTHESIA: ICD-10-PCS | Mod: ANES,,, | Performed by: ANESTHESIOLOGY

## 2022-08-31 PROCEDURE — 63600175 PHARM REV CODE 636 W HCPCS

## 2022-08-31 PROCEDURE — 96361 HYDRATE IV INFUSION ADD-ON: CPT

## 2022-08-31 RX ORDER — ROCURONIUM BROMIDE 50 MG/5 ML
SYRINGE (ML) INTRAVENOUS
Status: DISCONTINUED | OUTPATIENT
Start: 2022-08-31 | End: 2022-08-31

## 2022-08-31 RX ORDER — PROMETHAZINE HYDROCHLORIDE 25 MG/ML
INJECTION, SOLUTION INTRAMUSCULAR; INTRAVENOUS
Status: DISCONTINUED | OUTPATIENT
Start: 2022-08-31 | End: 2022-08-31

## 2022-08-31 RX ORDER — TALC
6 POWDER (GRAM) TOPICAL NIGHTLY PRN
Status: DISCONTINUED | OUTPATIENT
Start: 2022-08-31 | End: 2022-09-03 | Stop reason: HOSPADM

## 2022-08-31 RX ORDER — LIDOCAINE HYDROCHLORIDE 20 MG/ML
INJECTION, SOLUTION EPIDURAL; INFILTRATION; INTRACAUDAL; PERINEURAL
Status: DISCONTINUED | OUTPATIENT
Start: 2022-08-31 | End: 2022-08-31

## 2022-08-31 RX ORDER — MORPHINE SULFATE 10 MG/ML
4 INJECTION INTRAMUSCULAR; INTRAVENOUS; SUBCUTANEOUS EVERY 5 MIN PRN
Status: DISCONTINUED | OUTPATIENT
Start: 2022-08-31 | End: 2022-08-31 | Stop reason: HOSPADM

## 2022-08-31 RX ORDER — HYDROCODONE BITARTRATE AND ACETAMINOPHEN 7.5; 325 MG/1; MG/1
1 TABLET ORAL EVERY 6 HOURS PRN
Status: DISCONTINUED | OUTPATIENT
Start: 2022-08-31 | End: 2022-09-03 | Stop reason: HOSPADM

## 2022-08-31 RX ORDER — SODIUM CHLORIDE, SODIUM LACTATE, POTASSIUM CHLORIDE, CALCIUM CHLORIDE 600; 310; 30; 20 MG/100ML; MG/100ML; MG/100ML; MG/100ML
125 INJECTION, SOLUTION INTRAVENOUS CONTINUOUS
Status: DISCONTINUED | OUTPATIENT
Start: 2022-08-31 | End: 2022-09-01

## 2022-08-31 RX ORDER — PHENYLEPHRINE HYDROCHLORIDE 10 MG/ML
INJECTION INTRAVENOUS
Status: DISCONTINUED | OUTPATIENT
Start: 2022-08-31 | End: 2022-08-31

## 2022-08-31 RX ORDER — MORPHINE SULFATE 10 MG/ML
4 INJECTION INTRAMUSCULAR; INTRAVENOUS; SUBCUTANEOUS EVERY 4 HOURS PRN
Status: DISCONTINUED | OUTPATIENT
Start: 2022-08-31 | End: 2022-09-03 | Stop reason: HOSPADM

## 2022-08-31 RX ORDER — ACETAMINOPHEN 325 MG/1
650 TABLET ORAL EVERY 6 HOURS PRN
Status: DISCONTINUED | OUTPATIENT
Start: 2022-08-31 | End: 2022-09-03 | Stop reason: HOSPADM

## 2022-08-31 RX ORDER — OXYCODONE HYDROCHLORIDE 5 MG/1
5 TABLET ORAL
Status: DISCONTINUED | OUTPATIENT
Start: 2022-08-31 | End: 2022-08-31 | Stop reason: HOSPADM

## 2022-08-31 RX ORDER — ONDANSETRON 2 MG/ML
INJECTION INTRAMUSCULAR; INTRAVENOUS
Status: DISCONTINUED | OUTPATIENT
Start: 2022-08-31 | End: 2022-08-31

## 2022-08-31 RX ORDER — HYDROMORPHONE HYDROCHLORIDE 2 MG/ML
0.5 INJECTION, SOLUTION INTRAMUSCULAR; INTRAVENOUS; SUBCUTANEOUS EVERY 5 MIN PRN
Status: DISCONTINUED | OUTPATIENT
Start: 2022-08-31 | End: 2022-08-31 | Stop reason: HOSPADM

## 2022-08-31 RX ORDER — ONDANSETRON 2 MG/ML
4 INJECTION INTRAMUSCULAR; INTRAVENOUS DAILY PRN
Status: DISCONTINUED | OUTPATIENT
Start: 2022-08-31 | End: 2022-08-31 | Stop reason: HOSPADM

## 2022-08-31 RX ORDER — FENTANYL CITRATE 50 UG/ML
INJECTION, SOLUTION INTRAMUSCULAR; INTRAVENOUS
Status: DISCONTINUED | OUTPATIENT
Start: 2022-08-31 | End: 2022-08-31

## 2022-08-31 RX ORDER — ENOXAPARIN SODIUM 100 MG/ML
40 INJECTION SUBCUTANEOUS EVERY 24 HOURS
Status: DISCONTINUED | OUTPATIENT
Start: 2022-09-01 | End: 2022-09-03 | Stop reason: HOSPADM

## 2022-08-31 RX ORDER — KETOROLAC TROMETHAMINE 30 MG/ML
INJECTION, SOLUTION INTRAMUSCULAR; INTRAVENOUS
Status: DISCONTINUED | OUTPATIENT
Start: 2022-08-31 | End: 2022-08-31

## 2022-08-31 RX ORDER — METHYLENE BLUE 5 MG/ML
INJECTION INTRAVENOUS
Status: DISCONTINUED | OUTPATIENT
Start: 2022-08-31 | End: 2022-08-31 | Stop reason: HOSPADM

## 2022-08-31 RX ORDER — SERTRALINE HYDROCHLORIDE 50 MG/1
150 TABLET, FILM COATED ORAL DAILY
Status: DISCONTINUED | OUTPATIENT
Start: 2022-08-31 | End: 2022-09-03 | Stop reason: HOSPADM

## 2022-08-31 RX ORDER — ONDANSETRON 2 MG/ML
4 INJECTION INTRAMUSCULAR; INTRAVENOUS EVERY 8 HOURS PRN
Status: DISCONTINUED | OUTPATIENT
Start: 2022-08-31 | End: 2022-08-31

## 2022-08-31 RX ORDER — BUPIVACAINE HYDROCHLORIDE 2.5 MG/ML
INJECTION, SOLUTION EPIDURAL; INFILTRATION; INTRACAUDAL
Status: DISCONTINUED | OUTPATIENT
Start: 2022-08-31 | End: 2022-08-31 | Stop reason: HOSPADM

## 2022-08-31 RX ORDER — PROPOFOL 10 MG/ML
VIAL (ML) INTRAVENOUS
Status: DISCONTINUED | OUTPATIENT
Start: 2022-08-31 | End: 2022-08-31

## 2022-08-31 RX ORDER — DIPHENHYDRAMINE HYDROCHLORIDE 50 MG/ML
25 INJECTION INTRAMUSCULAR; INTRAVENOUS EVERY 6 HOURS PRN
Status: DISCONTINUED | OUTPATIENT
Start: 2022-08-31 | End: 2022-08-31 | Stop reason: HOSPADM

## 2022-08-31 RX ORDER — MEPERIDINE HYDROCHLORIDE 25 MG/ML
25 INJECTION INTRAMUSCULAR; INTRAVENOUS; SUBCUTANEOUS EVERY 10 MIN PRN
Status: DISCONTINUED | OUTPATIENT
Start: 2022-08-31 | End: 2022-08-31 | Stop reason: HOSPADM

## 2022-08-31 RX ORDER — SODIUM CHLORIDE, SODIUM LACTATE, POTASSIUM CHLORIDE, CALCIUM CHLORIDE 600; 310; 30; 20 MG/100ML; MG/100ML; MG/100ML; MG/100ML
INJECTION, SOLUTION INTRAVENOUS CONTINUOUS
Status: DISCONTINUED | OUTPATIENT
Start: 2022-08-31 | End: 2022-09-01

## 2022-08-31 RX ORDER — TRAZODONE HYDROCHLORIDE 150 MG/1
150 TABLET ORAL NIGHTLY
Status: DISCONTINUED | OUTPATIENT
Start: 2022-08-31 | End: 2022-09-03 | Stop reason: HOSPADM

## 2022-08-31 RX ORDER — DEXTROSE MONOHYDRATE AND SODIUM CHLORIDE 5; .45 G/100ML; G/100ML
INJECTION, SOLUTION INTRAVENOUS CONTINUOUS
Status: DISCONTINUED | OUTPATIENT
Start: 2022-08-31 | End: 2022-09-01

## 2022-08-31 RX ORDER — CEFAZOLIN SODIUM 1 G/3ML
INJECTION, POWDER, FOR SOLUTION INTRAMUSCULAR; INTRAVENOUS
Status: DISCONTINUED | OUTPATIENT
Start: 2022-08-31 | End: 2022-08-31

## 2022-08-31 RX ADMIN — PHENYLEPHRINE HYDROCHLORIDE 100 MCG: 10 INJECTION INTRAVENOUS at 10:08

## 2022-08-31 RX ADMIN — FENTANYL CITRATE 100 MCG: 50 INJECTION INTRAMUSCULAR; INTRAVENOUS at 09:08

## 2022-08-31 RX ADMIN — FENTANYL CITRATE 100 MCG: 50 INJECTION INTRAMUSCULAR; INTRAVENOUS at 08:08

## 2022-08-31 RX ADMIN — ACETAMINOPHEN 650 MG: 325 TABLET ORAL at 06:08

## 2022-08-31 RX ADMIN — Medication 40 MG: at 08:08

## 2022-08-31 RX ADMIN — Medication 35 MG: at 10:08

## 2022-08-31 RX ADMIN — CEFAZOLIN 2 G: 1 INJECTION, POWDER, FOR SOLUTION INTRAMUSCULAR; INTRAVENOUS; PARENTERAL at 09:08

## 2022-08-31 RX ADMIN — SODIUM CHLORIDE, POTASSIUM CHLORIDE, SODIUM LACTATE AND CALCIUM CHLORIDE: 600; 310; 30; 20 INJECTION, SOLUTION INTRAVENOUS at 08:08

## 2022-08-31 RX ADMIN — SUGAMMADEX 200 MG: 100 INJECTION, SOLUTION INTRAVENOUS at 11:08

## 2022-08-31 RX ADMIN — ONDANSETRON 8 MG: 2 INJECTION INTRAMUSCULAR; INTRAVENOUS at 09:08

## 2022-08-31 RX ADMIN — SODIUM CHLORIDE, POTASSIUM CHLORIDE, SODIUM LACTATE AND CALCIUM CHLORIDE: 600; 310; 30; 20 INJECTION, SOLUTION INTRAVENOUS at 07:08

## 2022-08-31 RX ADMIN — PROPOFOL 150 MG: 10 INJECTION, EMULSION INTRAVENOUS at 08:08

## 2022-08-31 RX ADMIN — PROMETHAZINE HYDROCHLORIDE 12.5 MG: 25 INJECTION INTRAMUSCULAR; INTRAVENOUS at 09:08

## 2022-08-31 RX ADMIN — PHENYLEPHRINE HYDROCHLORIDE 200 MCG: 10 INJECTION INTRAVENOUS at 10:08

## 2022-08-31 RX ADMIN — PHENYLEPHRINE HYDROCHLORIDE 100 MCG: 10 INJECTION INTRAVENOUS at 11:08

## 2022-08-31 RX ADMIN — TRAZODONE HYDROCHLORIDE 150 MG: 150 TABLET ORAL at 09:08

## 2022-08-31 RX ADMIN — LIDOCAINE HYDROCHLORIDE 50 MG: 20 INJECTION, SOLUTION EPIDURAL; INFILTRATION; INTRACAUDAL; PERINEURAL at 08:08

## 2022-08-31 RX ADMIN — KETOROLAC TROMETHAMINE 15 MG: 30 INJECTION, SOLUTION INTRAMUSCULAR at 11:08

## 2022-08-31 NOTE — PLAN OF CARE
To floor via stretcher. Resp even and unlabored. Skin warm and dry to touch. No acute distress noted. Son at bedside.

## 2022-08-31 NOTE — ANESTHESIA PREPROCEDURE EVALUATION
08/31/2022  Heydi Ceja is a 82 y.o., female.      Pre-op Assessment    I have reviewed the Patient Summary Reports.     I have reviewed the Nursing Notes. I have reviewed the NPO Status.   I have reviewed the Medications.     Review of Systems  Anesthesia Hx:  No problems with previous Anesthesia Hx of Anesthetic complications    Social:  Non-Smoker, No Alcohol Use    Hematology/Oncology:  Hematology Normal   Oncology Normal     EENT/Dental:EENT/Dental Normal   Cardiovascular:   hyperlipidemia    Pulmonary:  Pulmonary Normal    Renal/:  Renal/ Normal     Hepatic/GI:  Hepatic/GI Normal    Musculoskeletal:  Musculoskeletal Normal    Neurological:  Neurology Normal    Endocrine:   Hypothyroidism    Dermatological:  Skin Normal    Psych:   anxiety          Physical Exam  General: Well nourished    Airway:  Mallampati: III / III  Mouth Opening: Normal  TM Distance: > 6 cm  Tongue: Normal  Neck ROM: Normal ROM    Chest/Lungs:  Clear to auscultation, Normal Respiratory Rate    Heart:  Rate: Normal  Rhythm: Regular Rhythm        Anesthesia Plan  Type of Anesthesia, risks & benefits discussed:    Anesthesia Type: Gen ETT  Intra-op Monitoring Plan: Standard ASA Monitors  Post Op Pain Control Plan: multimodal analgesia  Induction:  IV  Informed Consent: Informed consent signed with the Patient and all parties understand the risks and agree with anesthesia plan.  All questions answered.   ASA Score: 2  Day of Surgery Review of History & Physical: H&P Update referred to the surgeon/provider.I have interviewed and examined the patient. I have reviewed the patient's H&P dated: There are no significant changes. H&P completed by Anesthesiologist.    Ready For Surgery From Anesthesia Perspective.     .

## 2022-08-31 NOTE — OP NOTE
[facility]     Operative Note    SUMMARY     Date of Procedure: 8/31/2022     Procedure: Procedure(s) (LRB):  BIOPSY, BREAST, WITH LUMPECTOMY (Left)  CHOLECYSTECTOMY, LAPAROSCOPIC (N/A)  BIOPSY, LYMPH NODE, SENTINEL (Left)  LYMPHADENECTOMY, AXILLARY (Left)     Surgeon(s) and Role:     * Dany Paula MD - Primary    Assisting Surgeon: None    Pre-Operative Diagnosis: Left breast cancer]  Gallstones [K80.20]    Post-Operative Diagnosis: Post-Op Diagnosis Codes:     * Left breast cancer     * Gallstones [K80.20]    Anesthesia: General    Technical Procedures Used: The patient was brought to the operating room and placed in supine position.  General anesthesia was administered.  Methylene blue was injected at the areola margin on the affected side.  Alamo node biopsy was then performed in the right axilla using the AB Tasty counter to determine the area of highest count. Blue dye was never seen, but there was a firm conglomeration of matted nodes identified.  This was dissected out with blunt and sharp dissection.  There was a nerve adjacent to this, and unavoidable a sacrificed.  It was not thought to be that the nerve was a motor nerve.  Clips were placed where necessary for control of the vessels and lymphatics. Nodes were sent to pathology.     While waiting on pathology, the partial mastectomy was performed.  Linear incision was performed over the upper aspect of the left breast.  Subsequent to this, circumferential dissection was then performed around the palpable breast mass removing a reasonable margin of normal breast tissue as well.   Specimen was marked as to orientation for cephalad and lateral aspect and sent to pathology.  The dissection had extended very near the axilla, and pathology called back with the report that 1 of the 5 identified nodes within the specimen that had been sent to them was positive for cancer.  The other 4 nodes were reserved for permanent  evaluation.  The lateral aspect of the breast dissection was then carried over into the axilla.  Fatty tissue was excised from the axilla, being careful in the expected locations of the long thoracic and thoracodorsal nerves.  These nerves  were never definitively identified. Nonetheless no tissue was transected without testing for nerve. After removing the axillary tail and its contents, irrigation was performed with sterile water.  A single drain was placed through separate stab incision. The axillary wound was then closed using interrupted Vicryl followed by continuous running 4-0 Vicryl in the subcuticular position. The partial mastectomy wound was then closed using interrupted Vicryl to approximate the deep dermis followed by continuous running 4-0 Vicryl reapproximated the skin in subcuticular position. Steri-Strips were placed and sterile dressings were applied. The drain was secured with a nylon drain stitch.   The abdomen had been prepped and draped in standard fashion. An infraumbilical incision was performed and dissection was carried down through subcutaneous tissue bluntly. The fascia was sharply transected and the peritoneum was carefully entered. CO2 pneumoperitoneum was established after inserting Faiza cannula. 5 mm trochars were then placed in the epigastrium, midepigastrium, and right upper quadrant. The fundus was retracted upward and over the liver, and the infundibulum was retracted laterally. The cystic duct and cystic artery were dissected out, clipped twice proximally, once distally and transected. Cautery was then used to dissect the gallbladder free from the surface of the liver.  There was good hemostasis. Port sites were then infiltrated with local at the level of the fascia and peritoneum. The gallbladder was removed using the push technique. Ports were then removed and CO2 pneumoperitoneum was allowed to escape. The fascia of the supraumbilical incision was closed using interrupted  PDS. All port sites closed skin level with subcuticular Vicryl. Patient was awakened from anesthesia in stable condition.       Description of the Findings of the Procedure:  There was not a sentinel node identifiable by blue dye, but there were several matted lymph nodes identified deep within the axilla, that did have some radioactivity, which guided us to their location.  Only 1 of these 5 matted lymph nodes was evaluated by frozen section, but was noted to have cancer.  As a result, a axillary node dissection was performed after removing the breast mass.  Gallbladder was able to be removed laparoscopically, identifying the duct and artery categorically prior to their transection.    Assistant(s): LAMAR Marquez OMS3    Complications: No    Estimated Blood Loss (EBL): * No values recorded between 8/31/2022  9:20 AM and 8/31/2022 11:59 AM *           Implants: * No implants in log *    Specimens:   Specimen (24h ago, onward)       Start     Ordered    08/31/22 1129  Surgical Pathology  RELEASE UPON ORDERING         08/31/22 1129    08/31/22 1034  Surgical Pathology  RELEASE UPON ORDERING         08/31/22 1034    08/31/22 1003  Surgical Pathology  RELEASE UPON ORDERING         08/31/22 1003    08/31/22 0958  Surgical Pathology  RELEASE UPON ORDERING         08/31/22 0958                     Condition: Good      Complications:  None

## 2022-08-31 NOTE — TRANSFER OF CARE
"Anesthesia Transfer of Care Note    Patient: Heydi Ceja    Procedure(s) Performed: Procedure(s) (LRB):  BIOPSY, BREAST, WITH LUMPECTOMY (Left)  CHOLECYSTECTOMY, LAPAROSCOPIC (N/A)  BIOPSY, LYMPH NODE, SENTINEL (Left)  LYMPHADENECTOMY, AXILLARY (Left)    Patient location: PACU    Anesthesia Type: general    Transport from OR: Transported from OR on 6-10 L/min O2 by face mask with adequate spontaneous ventilation    Post pain: adequate analgesia    Post assessment: no apparent anesthetic complications    Post vital signs: stable    Level of consciousness: responds to stimulation and sedated    Nausea/Vomiting: no nausea/vomiting    Complications: none    Transfer of care protocol was followed      Last vitals:   Visit Vitals  BP (!) 164/81 (BP Location: Right arm, Patient Position: Lying)   Pulse 85   Temp 36.4 °C (97.6 °F) (Axillary)   Resp 14   Ht 5' 6" (1.676 m)   Wt 69.6 kg (153 lb 6.4 oz)   SpO2 100%   Breastfeeding No   BMI 24.76 kg/m²     "

## 2022-08-31 NOTE — ANESTHESIA PROCEDURE NOTES
Intubation    Date/Time: 8/31/2022 8:58 AM  Performed by: Hilda Pacheco CRNA  Authorized by: Ap Atkinson MD     Intubation:     Induction:  Intravenous    Intubated:  Postinduction    Mask Ventilation:  Easy mask    Attempts:  1    Attempted By:  CRNA    Method of Intubation:  Direct    Blade:  Rosa 4    Laryngeal View Grade: Grade IIA - cords partially seen      Difficult Airway Encountered?: No      Complications:  None    Airway Device:  Oral endotracheal tube    Airway Device Size:  7.0    Style/Cuff Inflation:  Cuffed    Inflation Amount (mL):  7    Tube secured:  21    Placement Verified By:  Capnometry    Complicating Factors:  None    Findings Post-Intubation:  BS equal bilateral and atraumatic/condition of teeth unchanged

## 2022-08-31 NOTE — OR NURSING
1202 Rec'd pt to PACU asleep with oral airway in place. VSS. No signs of distress noted. Left breast/axillary dressings C/D/I. MELANIE drain secure and intact to bulb suction with small amount of bloody drainage noted. Abd lap sites x4 clean and intact with scant amount of bloody drainage noted. Will continue to monitor.     1205 Oral airway removed. Respirations even and unlabored. Reoriented to surroundings. Denies pain/needs at this time.     1236 Out of PACU. VSS. No signs of bleeding/distress noted.     1240 Pt to ASC 17 awake and alert with no distress noted. Respirations even and unlabored. Family at bedside. Bedside report given to ANTHONY Myers RN. Left breast/axillary dressings C/D/I. MELANIE drain to bulb suction secure and intact with small amount of bloody drainage noted. Abd lap sites x4 clean and dry with scant amount of bloody drainage noted; no change from initial assessment. Denies needs at this time. /75, P 84, R 16, O2 99% 2L NC.

## 2022-09-01 LAB
DHEA SERPL-MCNC: NORMAL
ESTROGEN SERPL-MCNC: NORMAL PG/ML
INSULIN SERPL-ACNC: NORMAL U[IU]/ML
LAB AP GROSS DESCRIPTION: NORMAL
LAB AP INTRA OP: NORMAL
LAB AP LABORATORY NOTES: NORMAL
LAB AP SYNOPTIC CHECKLIST: NORMAL
T3RU NFR SERPL: NORMAL %

## 2022-09-01 PROCEDURE — S5010 5% DEXTROSE AND 0.45% SALINE: HCPCS | Performed by: SURGERY

## 2022-09-01 PROCEDURE — 99900035 HC TECH TIME PER 15 MIN (STAT)

## 2022-09-01 PROCEDURE — G0378 HOSPITAL OBSERVATION PER HR: HCPCS

## 2022-09-01 PROCEDURE — 25000003 PHARM REV CODE 250: Performed by: SURGERY

## 2022-09-01 PROCEDURE — 97165 OT EVAL LOW COMPLEX 30 MIN: CPT

## 2022-09-01 PROCEDURE — 96361 HYDRATE IV INFUSION ADD-ON: CPT | Mod: 59

## 2022-09-01 RX ADMIN — TRAZODONE HYDROCHLORIDE 150 MG: 150 TABLET ORAL at 08:09

## 2022-09-01 RX ADMIN — ACETAMINOPHEN 650 MG: 325 TABLET ORAL at 08:09

## 2022-09-01 RX ADMIN — SERTRALINE HYDROCHLORIDE 150 MG: 50 TABLET ORAL at 08:09

## 2022-09-01 RX ADMIN — DEXTROSE AND SODIUM CHLORIDE: 5; 450 INJECTION, SOLUTION INTRAVENOUS at 03:09

## 2022-09-01 RX ADMIN — ACETAMINOPHEN 650 MG: 325 TABLET ORAL at 09:09

## 2022-09-01 NOTE — ANESTHESIA POSTPROCEDURE EVALUATION
Anesthesia Post Evaluation    Patient: Heydi Ceja    Procedure(s) Performed: Procedure(s) (LRB):  BIOPSY, BREAST, WITH LUMPECTOMY (Left)  CHOLECYSTECTOMY, LAPAROSCOPIC (N/A)  BIOPSY, LYMPH NODE, SENTINEL (Left)  LYMPHADENECTOMY, AXILLARY (Left)    Final Anesthesia Type: general      Patient location during evaluation: PACU  Patient participation: Yes- Able to Participate  Level of consciousness: awake and sedated  Post-procedure vital signs: reviewed and stable  Pain management: adequate  Airway patency: patent    PONV status at discharge: No PONV  Anesthetic complications: no      Cardiovascular status: blood pressure returned to baseline  Respiratory status: unassisted  Hydration status: euvolemic  Follow-up not needed.          Vitals Value Taken Time   /70 08/31/22 1610   Temp 36.8 °C (98.3 °F) 08/31/22 1610   Pulse 70 08/31/22 1610   Resp 17 08/31/22 1610   SpO2 96 % 08/31/22 1610         Event Time   Out of Recovery 12:36:00         Pain/Rajinder Score: Pain Rating Prior to Med Admin: 6 (8/31/2022  6:21 PM)  Pain Rating Post Med Admin: 5 (8/31/2022  6:54 PM)  Rajinder Score: 10 (8/31/2022 12:30 PM)

## 2022-09-01 NOTE — PLAN OF CARE
Problem: Adult Inpatient Plan of Care  Goal: Plan of Care Review  Outcome: Ongoing, Progressing  Goal: Patient-Specific Goal (Individualized)  Outcome: Ongoing, Progressing  Flowsheets (Taken 9/1/2022 0157)  Anxieties, Fears or Concerns: pain management  Individualized Care Needs: pain management  Patient-Specific Goals (Include Timeframe): pain management  Goal: Absence of Hospital-Acquired Illness or Injury  Outcome: Ongoing, Progressing  Goal: Optimal Comfort and Wellbeing  Outcome: Ongoing, Progressing  Goal: Readiness for Transition of Care  Outcome: Ongoing, Progressing     Problem: Infection  Goal: Absence of Infection Signs and Symptoms  Outcome: Ongoing, Progressing     Problem: Pain Acute  Goal: Acceptable Pain Control and Functional Ability  Outcome: Ongoing, Progressing

## 2022-09-01 NOTE — PT/OT/SLP EVAL
Occupational Therapy   Evaluation    Name: Heydi Ceja  MRN: [id number]  Admitting Diagnosis:  <principal problem not specified>  Recent Surgery: Procedure(s) (LRB):  BIOPSY, BREAST, WITH LUMPECTOMY (Left)  CHOLECYSTECTOMY, LAPAROSCOPIC (N/A)  BIOPSY, LYMPH NODE, SENTINEL (Left)  LYMPHADENECTOMY, AXILLARY (Left) 1 Day Post-Op    Recommendations:     Discharge Recommendations: home  Discharge Equipment Recommendations:  none  Barriers to discharge:  None    Assessment:     Heydi Ceja is a 82 y.o. female with a medical diagnosis of <principal problem not specified>.  She presents with s/p left partial mastectomy and cholecystectomy on 8/31/22. Performance deficits affecting function: impaired self care skills, impaired functional mobility, pain.  Pt educated on UE exercise and lymphedema prevention; pt provided handouts. Pt verbalized understanding.     Rehab Prognosis: Good; patient would benefit from acute skilled OT services to address these deficits and reach maximum level of function.       Plan:     Patient to be seen 5 x/week to address the above listed problems via self-care/home management, therapeutic activities, therapeutic exercises  Plan of Care Expires: 09/08/22  Plan of Care Reviewed with: patient    Subjective     Chief Complaint: s/p partial mastectomy and cholecystectomy   Patient/Family Comments/goals: pt agreeable to participate in OT eval    Occupational Profile:  Living Environment: pt lives alone but will return home with cousin upon discharge  Previous level of function: pt independent with all ADL tasks, IADL tasks, and functional mobility   Roles and Routines: perform self care;   Equipment Used at Home:  cane, quad, wheelchair, walker, rolling, bedside commode  Assistance upon Discharge: home with family assist as needed    Pain/Comfort:  Pain Rating 1: 0/10 (at rest)    Patients cultural, spiritual, Rastafarian conflicts given the current situation: no    Objective:      Communicated with: CISCO Orozco prior to session.  Patient found HOB elevated with MELANIE drain, peripheral IV upon OT entry to room.    General Precautions: Standard,     Orthopedic Precautions:N/A   Braces: N/A  Respiratory Status: Room air    Occupational Performance:    Bed Mobility:    Patient completed Supine to Sit with minimum assistance  Patient completed Sit to Supine with supervision    Functional Mobility/Transfers:  Patient completed Sit <> Stand Transfer with contact guard assistance  with  no assistive device   Functional Mobility: not performed; pt reports ambulating to bathroom with nursing staff    Activities of Daily Living:  Upper Body Dressing: minimum assistance to shivam gown    Cognitive/Visual Perceptual:  Cognitive/Psychosocial Skills:     -       Oriented to: Person, Place, Time, and Situation   -       Follows Commands/attention:Follows multistep  commands  -       Mood/Affect/Coping skills/emotional control: Cooperative    Physical Exam:  Balance:    -       WFL  Upper Extremity Range of Motion:     -       Right Upper Extremity: WFL  -       Left Upper Extremity: shoulder limited s/p mastectomy; elbow, wrist, and hand WFL  Upper Extremity Strength:    -       Right Upper Extremity: WFL  -       Left Upper Extremity: WFL  Gross motor coordination:   WFL    AMPAC 6 Click ADL:  AMPAC Total Score:      Treatment & Education:  Pt educated on OT role/POC.   Importance of OOB activity with staff assistance.  Importance of sitting up in the chair throughout the day as tolerated, especially for meals   Safety during functional t/f and mobility   Importance of assisting with self-care activities   All questions/concerns answered within OT scope of practice      Patient left HOB elevated with all lines intact, call button in reach, and CISCO Orozco notified    GOALS:   Multidisciplinary Problems       Occupational Therapy Goals          Problem: Occupational Therapy    Goal Priority Disciplines Outcome  Interventions   Occupational Therapy Goal     OT, PT/OT Ongoing, Progressing    Description: STG:  Pt will perform grooming with setup  Pt will bathe with Nahed with setup  Pt will perform UE dressing with Yola  Pt will perform LE dressing with Yola  Pt will transfer bed/chair/bsc with SBA   Pt will be independent with BUE HEP.       LT.Restore to max I with self care and mobility.                           History:     Past Medical History:   Diagnosis Date    Depression     Digestive disorder     Eczema     Hyperlipidemia     Insomnia     PONV (postoperative nausea and vomiting)     Thyroid disease          Past Surgical History:   Procedure Laterality Date    AXILLARY NODE DISSECTION Left 2022    Procedure: LYMPHADENECTOMY, AXILLARY;  Surgeon: Dany Paula MD;  Location: [facility];  Service: General;  Laterality: Left;    BREAST BIOPSY Left 2022    Procedure: BIOPSY, BREAST, WITH LUMPECTOMY;  Surgeon: Dany Paula MD;  Location: [facility];  Service: General;  Laterality: Left;    HYSTERECTOMY      JOINT REPLACEMENT      LAPAROSCOPIC CHOLECYSTECTOMY N/A 2022    Procedure: CHOLECYSTECTOMY, LAPAROSCOPIC;  Surgeon: Dany Paula MD;  Location: [facility];  Service: General;  Laterality: N/A;    SENTINEL LYMPH NODE BIOPSY Left 2022    Procedure: BIOPSY, LYMPH NODE, SENTINEL;  Surgeon: Dany Paula MD;  Location: [facility];  Service: General;  Laterality: Left;       Time Tracking:     OT Date of Treatment: 22  OT Start Time: 1327  OT Stop Time: 1344  OT Total Time (min): 17 min    Billable Minutes:Evaluation OT min complexity eval    2022

## 2022-09-01 NOTE — ASSESSMENT & PLAN NOTE
09/01/2022 dsg c/d/i, cathy serosanguinous  consult OT, breast binder, educated on cathy drain care, DC plans home am, yary ivf

## 2022-09-01 NOTE — PROGRESS NOTES
[facility]  General Surgery  Progress Note    Subjective:     History of Present Illness:  No notes on file    Post-Op Info:  Procedure(s) (LRB):  BIOPSY, BREAST, WITH LUMPECTOMY (Left)  CHOLECYSTECTOMY, LAPAROSCOPIC (N/A)  BIOPSY, LYMPH NODE, SENTINEL (Left)  LYMPHADENECTOMY, AXILLARY (Left)   1 Day Post-Op     No new subjective & objective note has been filed under this hospital service since the last note was generated.    Assessment/Plan:     Gallstones  09/01/2022 tolerating diet, dsg c/d/i    Left breast mass  09/01/2022 dsg c/d/i, ctahy serosanguinous  consult OT, breast binder, educated on cathy drain care, DC plans home am, yary ivf        Fabiola Gomez, YOLIE  General Surgery  [facility]

## 2022-09-01 NOTE — PLAN OF CARE
Problem: Occupational Therapy  Goal: Occupational Therapy Goal  Description: STG:  Pt will perform grooming with setup  Pt will bathe with Nahed with setup  Pt will perform UE dressing with Yola  Pt will perform LE dressing with Yola  Pt will transfer bed/chair/bsc with SBA   Pt will be independent with BUE HEP.       LT.Restore to max I with self care and mobility.      Outcome: Ongoing, Progressing

## 2022-09-02 PROCEDURE — 25000003 PHARM REV CODE 250: Performed by: SURGERY

## 2022-09-02 PROCEDURE — G0378 HOSPITAL OBSERVATION PER HR: HCPCS

## 2022-09-02 PROCEDURE — 94761 N-INVAS EAR/PLS OXIMETRY MLT: CPT

## 2022-09-02 PROCEDURE — 99900035 HC TECH TIME PER 15 MIN (STAT)

## 2022-09-02 PROCEDURE — 99024 PR POST-OP FOLLOW-UP VISIT: ICD-10-PCS | Mod: ,,, | Performed by: NURSE PRACTITIONER

## 2022-09-02 PROCEDURE — 99024 POSTOP FOLLOW-UP VISIT: CPT | Mod: ,,, | Performed by: NURSE PRACTITIONER

## 2022-09-02 RX ORDER — HYDROCODONE BITARTRATE AND ACETAMINOPHEN 7.5; 325 MG/1; MG/1
1 TABLET ORAL EVERY 6 HOURS PRN
Qty: 15 TABLET | Refills: 0 | Status: SHIPPED | OUTPATIENT
Start: 2022-09-02 | End: 2024-01-02

## 2022-09-02 RX ADMIN — TRAZODONE HYDROCHLORIDE 150 MG: 150 TABLET ORAL at 09:09

## 2022-09-02 RX ADMIN — ACETAMINOPHEN 650 MG: 325 TABLET ORAL at 05:09

## 2022-09-02 RX ADMIN — SERTRALINE HYDROCHLORIDE 150 MG: 50 TABLET ORAL at 09:09

## 2022-09-02 RX ADMIN — ACETAMINOPHEN 650 MG: 325 TABLET ORAL at 11:09

## 2022-09-02 NOTE — PLAN OF CARE
Problem: Pain Acute  Goal: Acceptable Pain Control and Functional Ability  Outcome: Ongoing, Progressing     Problem: Fall Injury Risk  Goal: Absence of Fall and Fall-Related Injury  Outcome: Ongoing, Progressing

## 2022-09-02 NOTE — PLAN OF CARE
Problem: Adult Inpatient Plan of Care  Goal: Plan of Care Review  Outcome: Ongoing, Progressing  Flowsheets (Taken 9/2/2022 1826)  Plan of Care Reviewed With: patient  Goal: Patient-Specific Goal (Individualized)  Outcome: Ongoing, Progressing  Flowsheets (Taken 9/2/2022 1826)  Anxieties, Fears or Concerns: Pain management  Individualized Care Needs: pain management  Patient-Specific Goals (Include Timeframe): pain management  Goal: Absence of Hospital-Acquired Illness or Injury  Outcome: Ongoing, Progressing  Goal: Optimal Comfort and Wellbeing  Outcome: Ongoing, Progressing  Goal: Readiness for Transition of Care  Outcome: Ongoing, Progressing

## 2022-09-02 NOTE — PLAN OF CARE
Problem: Occupational Therapy  Goal: Occupational Therapy Goal  Description: STG:  Pt will perform grooming with setup  Pt will bathe with Nahed with setup  Pt will perform UE dressing with Yola  Pt will perform LE dressing with Yola  Pt will transfer bed/chair/bsc with SBA   Pt will be independent with BUE HEP.       LT.Restore to max I with self care and mobility.      Outcome: Met

## 2022-09-02 NOTE — PT/OT/SLP DISCHARGE
Occupational Therapy Discharge Summary    Heydi Ceja  MRN: [id number]   Principal Problem: Left breast mass      Patient Discharged from acute Occupational Therapy on 22.  Please refer to prior OT note dated 22 for functional status.    Assessment:      Patient has met all goals and is not appropriate for therapy. Pt performing ADL tasks in room independently. Pt independent with BUE HEP.     Objective:     GOALS:   Multidisciplinary Problems       Occupational Therapy Goals       Not on file              Multidisciplinary Problems (Resolved)          Problem: Occupational Therapy    Goal Priority Disciplines Outcome Interventions   Occupational Therapy Goal   (Resolved)     OT, PT/OT Met    Description: STG:  Pt will perform grooming with setup  Pt will bathe with Nahed with setup  Pt will perform UE dressing with Yola  Pt will perform LE dressing with Yola  Pt will transfer bed/chair/bsc with SBA   Pt will be independent with BUE HEP.       LT.Restore to max I with self care and mobility.                           Reasons for Discontinuation of Therapy Services  Satisfactory goal achievement.      Plan:     Patient Discharged to: Home no OT services needed    2022

## 2022-09-02 NOTE — ASSESSMENT & PLAN NOTE
09/01/2022 dsg c/d/i, cathy serosanguinous  consult OT, breast binder, educated on cathy drain care, DC plans home am, dc ivf    09/02/2022 left breast inciion looks good no hematoma,  DC home with CATHY drain educated, gaby e-scribed to [facility] silverlon dressing to incision  prior to discharge, may remove abdominal op site dressings  once get home

## 2022-09-02 NOTE — DISCHARGE SUMMARY
[facility]  General Surgery  Discharge Summary      Patient Name: Heydi Ceja  MRN: [id number]  Admission Date: 8/31/2022  Hospital Length of Stay: 0 days  Discharge Date and Time:  09/02/2022 9:44 AM  Attending Physician: Dany Paula MD   Discharging Provider: YOLIE Aguilar  Primary Care Provider: Jose Rodriguez MD    HPI:   No notes on file    Procedure(s) (LRB):  BIOPSY, BREAST, WITH LUMPECTOMY (Left)  CHOLECYSTECTOMY, LAPAROSCOPIC (N/A)  BIOPSY, LYMPH NODE, SENTINEL (Left)  LYMPHADENECTOMY, AXILLARY (Left)      Indwelling Lines/Drains at time of discharge:   Lines/Drains/Airways     Drain  Duration                Closed/Suction Drain 08/31/22 1053 Lateral;Left Chest Bulb 1 day              Hospital Course: No notes on file     09/01/2022 dsg c/d/i, cathy serosanguinous  consult OT, breast binder, educated on cathy drain care, DC plans home am, dc ivf    09/02/2022 left breast inciion looks good no hematoma,  DC home with CATHY drain educated, norco e-scribed to [employer] silverlon dressing to incision  prior to discharge, may remove abdominal op site dressings  once get home      Goals of Care Treatment Preferences:         Consults:     Significant Diagnostic Studies:   Specimen (24h ago, onward)    None          Pending Diagnostic Studies:     Procedure Component Value Units Date/Time    EXTRA TUBES [[id number]] Collected: 08/31/22 0817    Order Status: Sent Lab Status: In process Updated: 08/31/22 0817    Specimen: Blood, Venous     Narrative:      The following orders were created for panel order EXTRA TUBES.  Procedure                               Abnormality         Status                     ---------                               -----------         ------                     Gold Top Hold[[id number]]                                    In process                   Please view results for these tests on the individual orders.    Surgical Pathology [[id number]] Collected:  08/31/22 1128    Order Status: Sent Lab Status: No result     Specimen: Tissue from Gallbladder         Final Active Diagnoses:    Diagnosis Date Noted POA    PRINCIPAL PROBLEM:  Left breast mass [N63.20] 08/14/2022 Yes    Gallstones [K80.20] 08/14/2022 Yes      Problems Resolved During this Admission:      Discharged Condition: good    Disposition: Home or Self Care    Follow Up:   Follow-up Information     Dany Paula MD. Schedule an appointment as soon as possible for a visit in 4 day(s).    Specialties: General Surgery, Surgery  Why: post op f/u remove MELANIE Drain tuesday  Contact information:  [address] [place] [id number]  [phone]                       Patient Instructions:      Diet Cardiac     Notify your health care provider if you experience any of the following:  temperature >100.4     Notify your health care provider if you experience any of the following:  persistent nausea and vomiting or diarrhea     Notify your health care provider if you experience any of the following:  redness, tenderness, or signs of infection (pain, swelling, redness, odor or green/yellow discharge around incision site)     Leave dressing on - Keep it clean, dry, and intact until clinic visit   Order Comments: Remove outer op site bandages  to abdomen tonight     Activity as tolerated     Shower on day dressing removed (No bath)   Order Comments: Ok to shower with silverlon dressing     Medications:  Reconciled Home Medications:      Medication List      START taking these medications    HYDROcodone-acetaminophen 7.5-325 mg per tablet  Commonly known as: NORCO  Take 1 tablet by mouth every 6 (six) hours as needed for Pain.        CONTINUE taking these medications    azelastine 137 mcg (0.1 %) nasal spray  Commonly known as: ASTELIN  1 spray (137 mcg total) by Nasal route 2 (two) times daily.     levothyroxine 125 MCG tablet  Commonly known as: SYNTHROID  Take 1 tablet (125 mcg total) by mouth before breakfast.      loratadine 10 mg tablet  Commonly known as: CLARITIN  Take 1 tablet (10 mg total) by mouth daily as needed for Allergies.     sertraline 100 MG tablet  Commonly known as: ZOLOFT  Take 1.5 tablets (150 mg total) by mouth once daily.     traZODone 150 MG tablet  Commonly known as: DESYREL  Take 1 tablet (150 mg total) by mouth nightly.        STOP taking these medications    amoxicillin-clavulanate 875-125mg 875-125 mg per tablet  Commonly known as: AUGMENTIN     azithromycin 250 MG tablet  Commonly known as: Z-HEATHER     brompheniramin-phenylephrin-DM 1-2.5-5 mg/5 mL Soln  Commonly known as: RYNEX DM     ondansetron 4 MG tablet  Commonly known as: ZOFRAN          Time spent on the discharge of patient: 30 minutes    YOLIE Aguilar  General Surgery  [facility]

## 2022-09-02 NOTE — DISCHARGE INSTRUCTIONS
Empty and record MELANIE output three times a day and as needed record output and bring to follow up appointment with dr kaykay loera

## 2022-09-03 VITALS
OXYGEN SATURATION: 94 % | WEIGHT: 153.38 LBS | RESPIRATION RATE: 18 BRPM | DIASTOLIC BLOOD PRESSURE: 77 MMHG | HEART RATE: 81 BPM | TEMPERATURE: 98 F | BODY MASS INDEX: 24.65 KG/M2 | HEIGHT: 66 IN | SYSTOLIC BLOOD PRESSURE: 141 MMHG

## 2022-09-03 LAB — TSH SERPL DL<=0.005 MIU/L-ACNC: 0.37 UIU/ML (ref 0.36–3.74)

## 2022-09-03 PROCEDURE — 84443 ASSAY THYROID STIM HORMONE: CPT | Performed by: NURSE PRACTITIONER

## 2022-09-03 PROCEDURE — 36415 COLL VENOUS BLD VENIPUNCTURE: CPT | Performed by: NURSE PRACTITIONER

## 2022-09-03 PROCEDURE — 99900035 HC TECH TIME PER 15 MIN (STAT)

## 2022-09-03 PROCEDURE — 25000003 PHARM REV CODE 250: Performed by: SURGERY

## 2022-09-03 PROCEDURE — 94761 N-INVAS EAR/PLS OXIMETRY MLT: CPT

## 2022-09-03 PROCEDURE — G0378 HOSPITAL OBSERVATION PER HR: HCPCS

## 2022-09-03 RX ADMIN — ACETAMINOPHEN 650 MG: 325 TABLET ORAL at 10:09

## 2022-09-03 NOTE — NURSING
Patient discharge teaching completed, patient verbalizes understanding of how to empty, measure, and recompress MELANIE drain.

## 2022-09-06 ENCOUNTER — OFFICE VISIT (OUTPATIENT)
Dept: SURGERY | Facility: CLINIC | Age: 82
End: 2022-09-06
Attending: SURGERY
Payer: MEDICARE

## 2022-09-06 DIAGNOSIS — Z09 POSTOP CHECK: Primary | ICD-10-CM

## 2022-09-06 PROCEDURE — 99024 PR POST-OP FOLLOW-UP VISIT: ICD-10-PCS | Mod: ,,, | Performed by: SURGERY

## 2022-09-06 PROCEDURE — 99024 POSTOP FOLLOW-UP VISIT: CPT | Mod: ,,, | Performed by: SURGERY

## 2022-09-06 PROCEDURE — 99213 OFFICE O/P EST LOW 20 MIN: CPT | Mod: PBBFAC | Performed by: SURGERY

## 2022-09-06 NOTE — PROGRESS NOTES
Patient returns for follow-up after having segmental mastectomy and axillary node dissection, left.  She reports 10 cc of drainage over the past 24 hours.    On exam, there is ecchymosis and edema of left breast, laterally.  There is no motor weakness appreciable.  The MELANIE drain has thin serosanguineous fluid and was removed.    Her pathology did reveal 4/5 lymph nodes positive for metastatic cancer.  Her initial core biopsy revealed ER and IA positive.  HER2 status is negative    Follow-up with me in 10 days  Arrange appointment with Oncology and Radiation Oncology.

## 2022-09-07 DIAGNOSIS — Z17.0 MALIGNANT NEOPLASM OF LEFT BREAST IN FEMALE, ESTROGEN RECEPTOR POSITIVE, UNSPECIFIED SITE OF BREAST: Primary | ICD-10-CM

## 2022-09-07 DIAGNOSIS — C50.912 MALIGNANT NEOPLASM OF LEFT BREAST IN FEMALE, ESTROGEN RECEPTOR POSITIVE, UNSPECIFIED SITE OF BREAST: Primary | ICD-10-CM

## 2022-09-15 DIAGNOSIS — H01.004 BLEPHARITIS OF UPPER EYELIDS OF BOTH EYES, UNSPECIFIED TYPE: ICD-10-CM

## 2022-09-15 DIAGNOSIS — H10.9 CONJUNCTIVITIS OF BOTH EYES, UNSPECIFIED CONJUNCTIVITIS TYPE: ICD-10-CM

## 2022-09-15 DIAGNOSIS — H01.001 BLEPHARITIS OF UPPER EYELIDS OF BOTH EYES, UNSPECIFIED TYPE: ICD-10-CM

## 2022-09-15 RX ORDER — NEOMYCIN SULFATE, POLYMYXIN B SULFATE AND DEXAMETHASONE 3.5; 10000; 1 MG/ML; [USP'U]/ML; MG/ML
SUSPENSION/ DROPS OPHTHALMIC
Qty: 5 ML | Refills: 0 | OUTPATIENT
Start: 2022-09-15

## 2022-09-15 NOTE — TELEPHONE ENCOUNTER
Pt. Requesting refill on eyedrops. Unable to refill medication. Informed to schedule appt. With eye doctor if symptomatic. Pt. Expressed verbal understanding.

## 2022-09-19 ENCOUNTER — OFFICE VISIT (OUTPATIENT)
Dept: SURGERY | Facility: CLINIC | Age: 82
End: 2022-09-19
Attending: SURGERY
Payer: MEDICARE

## 2022-09-19 DIAGNOSIS — Z09 POSTOP CHECK: Primary | ICD-10-CM

## 2022-09-19 PROCEDURE — 99213 OFFICE O/P EST LOW 20 MIN: CPT | Mod: PBBFAC | Performed by: SURGERY

## 2022-09-19 PROCEDURE — 99024 POSTOP FOLLOW-UP VISIT: CPT | Mod: ,,, | Performed by: SURGERY

## 2022-09-19 PROCEDURE — 99024 PR POST-OP FOLLOW-UP VISIT: ICD-10-PCS | Mod: ,,, | Performed by: SURGERY

## 2022-09-19 NOTE — PROGRESS NOTES
Patient returns for follow-up from partial mastectomy and sentinel node biopsy with completion dissection.    She reports a fullness in the breast as well as some stinging pain within the forearm      She does have a moderate-sized seroma or hematoma of the left breast.  I feel that this is probably semi solid hematoma and was not aspirated today.    She has seen Radiation Oncology and has an appointment with medical oncology next week.    I reassured her about the pain in her arm, which should improve over time.    Follow-up with me in 3 months, unless the [facility] is desired.

## 2022-09-28 ENCOUNTER — OFFICE VISIT (OUTPATIENT)
Dept: DERMATOLOGY | Facility: CLINIC | Age: 82
End: 2022-09-28
Payer: MEDICARE

## 2022-09-28 DIAGNOSIS — L30.9 DERMATITIS, UNSPECIFIED: ICD-10-CM

## 2022-09-28 DIAGNOSIS — L08.9 SKIN INFECTION: Primary | ICD-10-CM

## 2022-09-28 PROCEDURE — 87077 CULTURE, WOUND: ICD-10-PCS | Mod: ,,, | Performed by: CLINICAL MEDICAL LABORATORY

## 2022-09-28 PROCEDURE — 87186 CULTURE, WOUND: ICD-10-PCS | Mod: ,,, | Performed by: CLINICAL MEDICAL LABORATORY

## 2022-09-28 PROCEDURE — 87077 CULTURE AEROBIC IDENTIFY: CPT | Mod: ,,, | Performed by: CLINICAL MEDICAL LABORATORY

## 2022-09-28 PROCEDURE — 87070 CULTURE OTHR SPECIMN AEROBIC: CPT | Mod: ,,, | Performed by: CLINICAL MEDICAL LABORATORY

## 2022-09-28 PROCEDURE — 99203 OFFICE O/P NEW LOW 30 MIN: CPT | Mod: ,,, | Performed by: DERMATOLOGY

## 2022-09-28 PROCEDURE — 99203 PR OFFICE/OUTPT VISIT, NEW, LEVL III, 30-44 MIN: ICD-10-PCS | Mod: ,,, | Performed by: DERMATOLOGY

## 2022-09-28 PROCEDURE — 87186 SC STD MICRODIL/AGAR DIL: CPT | Mod: ,,, | Performed by: CLINICAL MEDICAL LABORATORY

## 2022-09-28 PROCEDURE — 87070 CULTURE, WOUND: ICD-10-PCS | Mod: ,,, | Performed by: CLINICAL MEDICAL LABORATORY

## 2022-09-28 NOTE — PROGRESS NOTES
[facility]   Marlyn Hong MD    Patient Name: Heydi Ceja  Patient YOB: 1940   Date of Service: 9/28/22    CC: Rash and Lesions    HPI: Heydi Ceja is a 82 y.o. female here today for rash, located on the left under eye.  Rash has been present for 2 weeks.  Previous treatments include vaseline.  Patient is also concerned today about lesions on the bilateral inner ears.    Past Medical History:   Diagnosis Date    Depression     Digestive disorder     Eczema     Hyperlipidemia     Insomnia     PONV (postoperative nausea and vomiting)     Thyroid disease      Past Surgical History:   Procedure Laterality Date    AXILLARY NODE DISSECTION Left 08/31/2022    Procedure: LYMPHADENECTOMY, AXILLARY;  Surgeon: Dany Paula MD;  Location: [facility];  Service: General;  Laterality: Left;    BREAST BIOPSY Left 08/31/2022    Procedure: BIOPSY, BREAST, WITH LUMPECTOMY;  Surgeon: Dany Paula MD;  Location: [facility];  Service: General;  Laterality: Left;    CHOLECYSTECTOMY      HYSTERECTOMY      JOINT REPLACEMENT      LAPAROSCOPIC CHOLECYSTECTOMY N/A 08/31/2022    Procedure: CHOLECYSTECTOMY, LAPAROSCOPIC;  Surgeon: Dany Paula MD;  Location: [facility] OR;  Service: General;  Laterality: N/A;    SENTINEL LYMPH NODE BIOPSY Left 08/31/2022    Procedure: BIOPSY, LYMPH NODE, SENTINEL;  Surgeon: Dany Paula MD;  Location: [facility];  Service: General;  Laterality: Left;     Review of patient's allergies indicates:   Allergen Reactions    Corticosteroids (glucocorticoids)      Not able to tolerate       Current Outpatient Medications:     azelastine (ASTELIN) 137 mcg (0.1 %) nasal spray, 1 spray (137 mcg total) by Nasal route 2 (two) times daily., Disp: 30 mL, Rfl: 5    HYDROcodone-acetaminophen (NORCO) 7.5-325 mg per tablet, Take 1 tablet by mouth every 6 (six) hours as needed for Pain., Disp: 15 tablet, Rfl: 0    levothyroxine (SYNTHROID) 125 MCG tablet, Take 1 tablet (125 mcg total)  by mouth before breakfast., Disp: 30 tablet, Rfl: 3    loratadine (CLARITIN) 10 mg tablet, Take 1 tablet (10 mg total) by mouth daily as needed for Allergies., Disp: 30 tablet, Rfl: 1    sertraline (ZOLOFT) 100 MG tablet, Take 1.5 tablets (150 mg total) by mouth once daily., Disp: 135 tablet, Rfl: 1    traZODone (DESYREL) 150 MG tablet, Take 1 tablet (150 mg total) by mouth nightly., Disp: 90 tablet, Rfl: 1    ROS: A focused review of systems was obtained and negative.     Exam: A focused skin exam was performed. All areas examined were normal except as mentioned in the assessment and plan below.  General Appearance of the patient is well developed and well nourished.  Orientation: alert and oriented x 3.  Mood and affect: pleasant.    Assessment:   The primary encounter diagnosis was Skin infection. A diagnosis of Dermatitis, unspecified was also pertinent to this visit.    Plan:      Dermatitis Unspecified  - pink plaques with crusting and pus of the upper and lower eyelids with conjunctival infection  DDx: infection vs ACD    Plan: Counseling  I counseled the patient regarding the following:  Skin care: Patient instructed to use gentle skin care including dove unscented soap, CeraVe moisturizing cream, and fragrance free laundry detergent.  Expectations: The patient understands that there is not a definitive diagnosis at this time. Further testing or empiric therapy may be necessary to diagnose and improve the condition.  Contact office if: The patient develops a fever, or rash dramatically worsens despite treatment.    Plan: Discussion of Management with External Provider: Dr. Ford  Discussion Details: will refer to Dr. Ford to r/o infection     Skin Infection, NOS   - crusting of eyelids    Plan: Counseling  I counseled the patient regarding the following:  Skin care: Patients with purulence or fluid collections should have their wounds re-opened, drained, cultured and irrigated. All wound infections  should be treated with antibiotics.  Expectations: Wound Infections usually occur 4-7 days postoperatively. Patients exhibit, pain, rednes, swelling, cellulitic changes and fever.  Contact Office if: Wound Infection fails to respond to treatment or worsens, patient develops a fever, or if redness spreads despite antibiotics.    A bacterial culture was obtained from the left tear duct    - Will refer to Dr. Ford           Follow up if symptoms worsen or fail to improve.    Marlyn Hong MD

## 2022-10-01 LAB — MICROORGANISM SPEC CULT: ABNORMAL

## 2022-10-05 ENCOUNTER — HOSPITAL ENCOUNTER (OUTPATIENT)
Dept: RADIOLOGY | Facility: HOSPITAL | Age: 82
Discharge: HOME | End: 2022-10-05
Attending: INTERNAL MEDICINE
Payer: MEDICARE

## 2022-10-05 DIAGNOSIS — C50.919 BREAST CANCER: ICD-10-CM

## 2022-10-05 PROCEDURE — 25500020 PHARM REV CODE 255: Performed by: INTERNAL MEDICINE

## 2022-10-05 PROCEDURE — 71260 CT CHEST ABDOMEN PELVIS WITH CONTRAST (XPD): ICD-10-PCS | Mod: 26,,, | Performed by: STUDENT IN AN ORGANIZED HEALTH CARE EDUCATION/TRAINING PROGRAM

## 2022-10-05 PROCEDURE — 74177 CT ABD & PELVIS W/CONTRAST: CPT | Mod: TC

## 2022-10-05 PROCEDURE — 71260 CT THORAX DX C+: CPT | Mod: 26,,, | Performed by: STUDENT IN AN ORGANIZED HEALTH CARE EDUCATION/TRAINING PROGRAM

## 2022-10-05 PROCEDURE — 71260 CT THORAX DX C+: CPT | Mod: TC

## 2022-10-05 PROCEDURE — 74177 CT CHEST ABDOMEN PELVIS WITH CONTRAST (XPD): ICD-10-PCS | Mod: 26,,, | Performed by: STUDENT IN AN ORGANIZED HEALTH CARE EDUCATION/TRAINING PROGRAM

## 2022-10-05 PROCEDURE — 74177 CT ABD & PELVIS W/CONTRAST: CPT | Mod: 26,,, | Performed by: STUDENT IN AN ORGANIZED HEALTH CARE EDUCATION/TRAINING PROGRAM

## 2022-10-05 RX ADMIN — IOPAMIDOL 100 ML: 755 INJECTION, SOLUTION INTRAVENOUS at 10:10

## 2022-11-10 ENCOUNTER — OFFICE VISIT (OUTPATIENT)
Dept: DERMATOLOGY | Facility: CLINIC | Age: 82
End: 2022-11-10
Payer: MEDICARE

## 2022-11-10 DIAGNOSIS — L08.9 STAPH SKIN INFECTION: Primary | ICD-10-CM

## 2022-11-10 DIAGNOSIS — B95.8 STAPH SKIN INFECTION: Primary | ICD-10-CM

## 2022-11-10 PROCEDURE — 87070 CULTURE, WOUND: ICD-10-PCS | Mod: ,,, | Performed by: CLINICAL MEDICAL LABORATORY

## 2022-11-10 PROCEDURE — 99214 OFFICE O/P EST MOD 30 MIN: CPT | Mod: ,,, | Performed by: DERMATOLOGY

## 2022-11-10 PROCEDURE — 99214 PR OFFICE/OUTPT VISIT, EST, LEVL IV, 30-39 MIN: ICD-10-PCS | Mod: ,,, | Performed by: DERMATOLOGY

## 2022-11-10 PROCEDURE — 87070 CULTURE OTHR SPECIMN AEROBIC: CPT | Mod: ,,, | Performed by: CLINICAL MEDICAL LABORATORY

## 2022-11-10 RX ORDER — CLINDAMYCIN HYDROCHLORIDE 300 MG/1
300 CAPSULE ORAL 3 TIMES DAILY
COMMUNITY
Start: 2022-10-04 | End: 2022-11-10 | Stop reason: SDUPTHER

## 2022-11-10 RX ORDER — MUPIROCIN 20 MG/G
OINTMENT TOPICAL
Qty: 1 G | Refills: 3 | Status: SHIPPED | OUTPATIENT
Start: 2022-11-10 | End: 2024-01-02

## 2022-11-10 RX ORDER — CLINDAMYCIN HYDROCHLORIDE 300 MG/1
300 CAPSULE ORAL 3 TIMES DAILY
Qty: 21 CAPSULE | Refills: 0 | Status: SHIPPED | OUTPATIENT
Start: 2022-11-10 | End: 2022-11-17

## 2022-11-10 NOTE — PROGRESS NOTES
[facility]   Marlyn Hong MD    Patient Name: Heydi Ceja  Patient YOB: 1940   Date of Service: 11/10/22    CC: Follow-up Skin Infection    HPI: Heydi Ceja is a 82 y.o. female here today for follow-up of eczema, last seen 09/22.  Previous treatments include moxifloxican drops, po clindamycin, and po augmentin.  Overall, skin infection is stable.  Treatment plan was followed as directed.    Past Medical History:   Diagnosis Date    Depression     Digestive disorder     Eczema     Hyperlipidemia     Insomnia     PONV (postoperative nausea and vomiting)     Thyroid disease      Past Surgical History:   Procedure Laterality Date    AXILLARY NODE DISSECTION Left 08/31/2022    Procedure: LYMPHADENECTOMY, AXILLARY;  Surgeon: Dany Paula MD;  Location: [facility];  Service: General;  Laterality: Left;    BREAST BIOPSY Left 08/31/2022    Procedure: BIOPSY, BREAST, WITH LUMPECTOMY;  Surgeon: Dany Paula MD;  Location: [facility] OR;  Service: General;  Laterality: Left;    CHOLECYSTECTOMY      HYSTERECTOMY      JOINT REPLACEMENT      LAPAROSCOPIC CHOLECYSTECTOMY N/A 08/31/2022    Procedure: CHOLECYSTECTOMY, LAPAROSCOPIC;  Surgeon: Dany Paula MD;  Location: [facility] OR;  Service: General;  Laterality: N/A;    SENTINEL LYMPH NODE BIOPSY Left 08/31/2022    Procedure: BIOPSY, LYMPH NODE, SENTINEL;  Surgeon: Dany Paula MD;  Location: [facility];  Service: General;  Laterality: Left;     Review of patient's allergies indicates:   Allergen Reactions    Corticosteroids (glucocorticoids)      Not able to tolerate       Current Outpatient Medications:     azelastine (ASTELIN) 137 mcg (0.1 %) nasal spray, 1 spray (137 mcg total) by Nasal route 2 (two) times daily., Disp: 30 mL, Rfl: 5    clindamycin (CLEOCIN) 300 MG capsule, Take 1 capsule (300 mg total) by mouth 3 (three) times daily. for 7 days, Disp: 21 capsule, Rfl: 0    HYDROcodone-acetaminophen (NORCO) 7.5-325 mg per tablet,  Take 1 tablet by mouth every 6 (six) hours as needed for Pain., Disp: 15 tablet, Rfl: 0    levothyroxine (SYNTHROID) 125 MCG tablet, Take 1 tablet (125 mcg total) by mouth before breakfast., Disp: 30 tablet, Rfl: 3    loratadine (CLARITIN) 10 mg tablet, Take 1 tablet (10 mg total) by mouth daily as needed for Allergies., Disp: 30 tablet, Rfl: 1    mupirocin (BACTROBAN) 2 % ointment, Apply to affected areas three times daily for 7 days, Disp: 1 g, Rfl: 3    sertraline (ZOLOFT) 100 MG tablet, Take 1.5 tablets (150 mg total) by mouth once daily., Disp: 135 tablet, Rfl: 1    traZODone (DESYREL) 150 MG tablet, Take 1 tablet (150 mg total) by mouth nightly., Disp: 90 tablet, Rfl: 1    ROS: A focused review of systems was obtained and negative.     Exam: A focused skin exam was performed. All areas examined were normal except as mentioned in the assessment and plan below.  General Appearance of the patient is well developed and well nourished.  Orientation: alert and oriented x 3.  Mood and affect: pleasant.    Assessment:   The encounter diagnosis was Skin infection.    Plan:   Medications Ordered This Encounter   Medications    clindamycin (CLEOCIN) 300 MG capsule     Sig: Take 1 capsule (300 mg total) by mouth 3 (three) times daily. for 7 days     Dispense:  21 capsule     Refill:  0    mupirocin (BACTROBAN) 2 % ointment     Sig: Apply to affected areas three times daily for 7 days     Dispense:  1 g     Refill:  3       Staph Skin Infection  - crusted papule on the left upper eye lid and bilateral johnny bowls. Eyes normal    Plan: Counseling  I counseled the patient regarding the following:  Skin care: Patients with purulence or fluid collections should have their wounds re-opened, drained, cultured and irrigated. All wound infections should be treated with antibiotics.  Expectations: Wound Infections usually occur 4-7 days postoperatively. Patients exhibit, pain, rednes, swelling, cellulitic changes and  fever.  Contact Office if: Wound Infection fails to respond to treatment or worsens, patient develops a fever, or if redness spreads despite antibiotics.    A bacterial culture was obtained from the left upper eyelid    - will restart clindamycin per last culture result and clinical response with Dr. Ford    Plan: Discussion of Management with External Provider: Dr. Ford  Discussion Details: Discussed recurrence and plan.  Will refer her to Dr. Ford next week if she does not improve or if her eyes become involved.          Follow up in about 1 week (around 11/17/2022) for Skin infection.    Marlyn Hong MD

## 2022-11-12 LAB — MICROORGANISM SPEC CULT: NORMAL

## 2022-11-21 DIAGNOSIS — L20.84 INTRINSIC ECZEMA: Primary | ICD-10-CM

## 2022-11-21 RX ORDER — PIMECROLIMUS 10 MG/G
CREAM TOPICAL
Qty: 100 G | Refills: 1 | Status: SHIPPED | OUTPATIENT
Start: 2022-11-21

## 2022-12-08 ENCOUNTER — OFFICE VISIT (OUTPATIENT)
Dept: DERMATOLOGY | Facility: CLINIC | Age: 82
End: 2022-12-08
Payer: MEDICARE

## 2022-12-08 DIAGNOSIS — L20.84 INTRINSIC ECZEMA: Primary | ICD-10-CM

## 2022-12-08 DIAGNOSIS — L08.9 SKIN INFECTION: ICD-10-CM

## 2022-12-08 PROCEDURE — 87070 CULTURE, WOUND: ICD-10-PCS | Mod: ,,, | Performed by: CLINICAL MEDICAL LABORATORY

## 2022-12-08 PROCEDURE — 87070 CULTURE OTHR SPECIMN AEROBIC: CPT | Mod: ,,, | Performed by: CLINICAL MEDICAL LABORATORY

## 2022-12-08 PROCEDURE — 99214 PR OFFICE/OUTPT VISIT, EST, LEVL IV, 30-39 MIN: ICD-10-PCS | Mod: ,,, | Performed by: DERMATOLOGY

## 2022-12-08 PROCEDURE — 99214 OFFICE O/P EST MOD 30 MIN: CPT | Mod: ,,, | Performed by: DERMATOLOGY

## 2022-12-08 RX ORDER — TRIAMCINOLONE ACETONIDE 0.25 MG/G
OINTMENT TOPICAL
Qty: 80 G | Refills: 1 | Status: SHIPPED | OUTPATIENT
Start: 2022-12-08 | End: 2024-01-02

## 2022-12-08 NOTE — PROGRESS NOTES
[facility]   Marlyn Hong MD    Patient Name: Heydi Ceja  Patient YOB: 1940   Date of Service: 12/8/22    CC: Follow-up staph infection    HPI: Heydi Ceja is a 82 y.o. female here today for follow-up of staph infection, last seen 11/22.  Previous treatments include mupirocin and PO Clindamycin.  Overall, the infection is improved, but still present.  Treatment plan was followed as directed.    Past Medical History:   Diagnosis Date    Depression     Digestive disorder     Eczema     Hyperlipidemia     Insomnia     PONV (postoperative nausea and vomiting)     Thyroid disease      Past Surgical History:   Procedure Laterality Date    AXILLARY NODE DISSECTION Left 08/31/2022    Procedure: LYMPHADENECTOMY, AXILLARY;  Surgeon: Dany Paula MD;  Location: [facility];  Service: General;  Laterality: Left;    BREAST BIOPSY Left 08/31/2022    Procedure: BIOPSY, BREAST, WITH LUMPECTOMY;  Surgeon: Dany Paula MD;  Location: [facility] OR;  Service: General;  Laterality: Left;    CHOLECYSTECTOMY      HYSTERECTOMY      JOINT REPLACEMENT      LAPAROSCOPIC CHOLECYSTECTOMY N/A 08/31/2022    Procedure: CHOLECYSTECTOMY, LAPAROSCOPIC;  Surgeon: Dany Paula MD;  Location: [facility] OR;  Service: General;  Laterality: N/A;    SENTINEL LYMPH NODE BIOPSY Left 08/31/2022    Procedure: BIOPSY, LYMPH NODE, SENTINEL;  Surgeon: Dany Paula MD;  Location: [facility];  Service: General;  Laterality: Left;     Review of patient's allergies indicates:  No Known Allergies      Current Outpatient Medications:     azelastine (ASTELIN) 137 mcg (0.1 %) nasal spray, 1 spray (137 mcg total) by Nasal route 2 (two) times daily., Disp: 30 mL, Rfl: 5    HYDROcodone-acetaminophen (NORCO) 7.5-325 mg per tablet, Take 1 tablet by mouth every 6 (six) hours as needed for Pain., Disp: 15 tablet, Rfl: 0    levothyroxine (SYNTHROID) 125 MCG tablet, TAKE 1 TABLET BY MOUTH BEFORE BREAKFAST DAILY, Disp: 90 tablet, Rfl:  0    loratadine (CLARITIN) 10 mg tablet, Take 1 tablet (10 mg total) by mouth daily as needed for Allergies., Disp: 30 tablet, Rfl: 1    mupirocin (BACTROBAN) 2 % ointment, Apply to affected areas three times daily for 7 days, Disp: 1 g, Rfl: 3    pimecrolimus (ELIDEL) 1 % cream, Apply to affected area twice daily., Disp: 100 g, Rfl: 1    sertraline (ZOLOFT) 100 MG tablet, Take 1.5 tablets (150 mg total) by mouth once daily., Disp: 135 tablet, Rfl: 1    traZODone (DESYREL) 150 MG tablet, Take 1 tablet (150 mg total) by mouth nightly., Disp: 90 tablet, Rfl: 1    triamcinolone acetonide 0.025% (KENALOG) 0.025 % Oint, Apply to affected area twice daily, tapering with improvement, Disp: 80 g, Rfl: 1    ROS: A focused review of systems was obtained and negative.     Exam: A focused skin exam was performed. All areas examined were normal except as mentioned in the assessment and plan below.  General Appearance of the patient is well developed and well nourished.  Orientation: alert and oriented x 3.  Mood and affect: pleasant.    Assessment:   The primary encounter diagnosis was Intrinsic eczema. A diagnosis of Skin infection was also pertinent to this visit.    Plan:   Medications Ordered This Encounter   Medications    triamcinolone acetonide 0.025% (KENALOG) 0.025 % Oint     Sig: Apply to affected area twice daily, tapering with improvement     Dispense:  80 g     Refill:  1       Dermatitis Unspecified  - eczematous plaques on the eyelids  DDx: eczema vs ACD vs ICD    Plan: Counseling  I counseled the patient regarding the following:  Skin care: Patient instructed to use gentle skin care including dove unscented soap, CeraVe moisturizing cream, and fragrance free laundry detergent.  Expectations: The patient understands that there is not a definitive diagnosis at this time. Further testing or empiric therapy may be necessary to diagnose and improve the condition.  Contact office if: The patient develops a fever, or  rash dramatically worsens despite treatment.    - previously infected with staph but infection cleared on last culture  - will start topical steroids for eczema (elidel not covered) and refer her for patch testing to r/o ACD     No follow-ups on file.    Marlyn Hong MD

## 2022-12-10 LAB — MICROORGANISM SPEC CULT: NO GROWTH

## 2022-12-19 PROBLEM — Z09 POSTOP CHECK: Status: RESOLVED | Noted: 2022-09-19 | Resolved: 2022-12-19

## 2023-01-09 ENCOUNTER — OFFICE VISIT (OUTPATIENT)
Dept: SURGERY | Facility: CLINIC | Age: 83
End: 2023-01-09
Attending: SURGERY
Payer: MEDICARE

## 2023-01-09 DIAGNOSIS — Z85.3 HISTORY OF BREAST CANCER: ICD-10-CM

## 2023-01-09 PROCEDURE — 99213 OFFICE O/P EST LOW 20 MIN: CPT | Mod: PBBFAC | Performed by: SURGERY

## 2023-01-09 PROCEDURE — 99212 OFFICE O/P EST SF 10 MIN: CPT | Mod: S$PBB,,, | Performed by: SURGERY

## 2023-01-09 PROCEDURE — 99212 PR OFFICE/OUTPT VISIT, EST, LEVL II, 10-19 MIN: ICD-10-PCS | Mod: S$PBB,,, | Performed by: SURGERY

## 2023-01-29 PROBLEM — Z85.3 HISTORY OF BREAST CANCER: Status: ACTIVE | Noted: 2023-01-29

## 2023-01-30 NOTE — PROGRESS NOTES
Subjective:       Patient ID: Heydi Ceja is a 82 y.o. female.    Chief Complaint: Cancer, Gall Bladder Problem, and Breast Problem (F/u breast cancer and gallbladder)    Patient returns for 3 month follow-up from partial mastectomy and sentinel node biopsy with completion dissection.  She is receiving chemo and had radiation.    She did have a hematoma of the breast, not operated.   No reported complaints.               Cancer    family history includes Breast cancer in her paternal aunt.  Past Medical History:   Diagnosis Date    Depression     Digestive disorder     Eczema     Hyperlipidemia     Insomnia     PONV (postoperative nausea and vomiting)     Thyroid disease       Past Surgical History:   Procedure Laterality Date    AXILLARY NODE DISSECTION Left 08/31/2022    Procedure: LYMPHADENECTOMY, AXILLARY;  Surgeon: Dany Paula MD;  Location: [facility];  Service: General;  Laterality: Left;    BREAST BIOPSY Left 08/31/2022    Procedure: BIOPSY, BREAST, WITH LUMPECTOMY;  Surgeon: Dany Paula MD;  Location: [facility] OR;  Service: General;  Laterality: Left;    CHOLECYSTECTOMY      HYSTERECTOMY      JOINT REPLACEMENT      LAPAROSCOPIC CHOLECYSTECTOMY N/A 08/31/2022    Procedure: CHOLECYSTECTOMY, LAPAROSCOPIC;  Surgeon: Dany Paula MD;  Location: [facility] OR;  Service: General;  Laterality: N/A;    SENTINEL LYMPH NODE BIOPSY Left 08/31/2022    Procedure: BIOPSY, LYMPH NODE, SENTINEL;  Surgeon: Dany Paula MD;  Location: [facility];  Service: General;  Laterality: Left;       reports that she has never smoked. She has never used smokeless tobacco. She reports that she does not drink alcohol and does not use drugs.     Review of Systems   All other systems reviewed and are negative.       Objective:      There were no vitals taken for this visit.   Physical Exam  Cardiovascular:      Rate and Rhythm: Normal rate.      Pulses: Normal pulses.      Heart sounds: No murmur heard.  Pulmonary:       Effort: Pulmonary effort is normal. No respiratory distress.   Chest:   Breasts:     Right: No mass.      Left: No mass.      Comments: Upper outer quadrant with no palpable mass  Abdominal:      Palpations: Abdomen is soft.   Musculoskeletal:         General: No swelling.   Lymphadenopathy:      Upper Body:      Right upper body: No axillary adenopathy.      Left upper body: No axillary adenopathy.   Neurological:      General: No focal deficit present.      Mental Status: She is alert.   Psychiatric:         Mood and Affect: Mood normal.         Assessment/Plan:         History of breast cancer         Problem List Items Addressed This Visit          Oncology    History of breast cancer    Current Assessment & Plan     Continue followup and chemo per oncology    Followup with me in 3 months.

## 2023-02-09 DIAGNOSIS — Z71.89 COMPLEX CARE COORDINATION: ICD-10-CM

## 2023-03-29 ENCOUNTER — OFFICE VISIT (OUTPATIENT)
Dept: FAMILY MEDICINE | Facility: CLINIC | Age: 83
End: 2023-03-29
Payer: MEDICARE

## 2023-03-29 VITALS
WEIGHT: 146 LBS | BODY MASS INDEX: 23.46 KG/M2 | TEMPERATURE: 98 F | RESPIRATION RATE: 18 BRPM | OXYGEN SATURATION: 95 % | HEIGHT: 66 IN | HEART RATE: 105 BPM | SYSTOLIC BLOOD PRESSURE: 118 MMHG | DIASTOLIC BLOOD PRESSURE: 80 MMHG

## 2023-03-29 DIAGNOSIS — G47.00 INSOMNIA, UNSPECIFIED TYPE: ICD-10-CM

## 2023-03-29 DIAGNOSIS — E03.9 HYPOTHYROIDISM, UNSPECIFIED TYPE: Primary | ICD-10-CM

## 2023-03-29 DIAGNOSIS — E11.9 DM (DIABETES MELLITUS): ICD-10-CM

## 2023-03-29 DIAGNOSIS — F32.A DEPRESSION, UNSPECIFIED DEPRESSION TYPE: ICD-10-CM

## 2023-03-29 DIAGNOSIS — Z13.1 SCREENING FOR DIABETES MELLITUS (DM): ICD-10-CM

## 2023-03-29 DIAGNOSIS — R13.10 DYSPHAGIA, UNSPECIFIED TYPE: ICD-10-CM

## 2023-03-29 DIAGNOSIS — I69.991 DYSPHAGIA FOLLOWING UNSPECIFIED CEREBROVASCULAR DISEASE: ICD-10-CM

## 2023-03-29 PROCEDURE — 99214 OFFICE O/P EST MOD 30 MIN: CPT | Mod: ,,, | Performed by: INTERNAL MEDICINE

## 2023-03-29 PROCEDURE — 99214 PR OFFICE/OUTPT VISIT, EST, LEVL IV, 30-39 MIN: ICD-10-PCS | Mod: ,,, | Performed by: INTERNAL MEDICINE

## 2023-03-30 RX ORDER — PROMETHAZINE HYDROCHLORIDE 25 MG/1
25 TABLET ORAL 3 TIMES DAILY PRN
Qty: 60 TABLET | Refills: 1 | Status: SHIPPED | OUTPATIENT
Start: 2023-03-30 | End: 2024-04-02

## 2023-03-30 RX ORDER — NITROFURANTOIN 25; 75 MG/1; MG/1
100 CAPSULE ORAL 2 TIMES DAILY
Qty: 10 CAPSULE | Refills: 0 | Status: SHIPPED | OUTPATIENT
Start: 2023-03-30 | End: 2024-01-02

## 2023-03-30 RX ORDER — LEVOTHYROXINE SODIUM 125 UG/1
125 TABLET ORAL
Qty: 30 TABLET | Refills: 1 | Status: SHIPPED | OUTPATIENT
Start: 2023-03-30 | End: 2023-10-12 | Stop reason: SDUPTHER

## 2023-03-30 RX ORDER — SERTRALINE HYDROCHLORIDE 100 MG/1
150 TABLET, FILM COATED ORAL DAILY
Qty: 135 TABLET | Refills: 1 | Status: SHIPPED | OUTPATIENT
Start: 2023-03-30 | End: 2023-10-12 | Stop reason: SDUPTHER

## 2023-03-30 RX ORDER — TRAZODONE HYDROCHLORIDE 150 MG/1
150 TABLET ORAL NIGHTLY
Qty: 90 TABLET | Refills: 1 | Status: SHIPPED | OUTPATIENT
Start: 2023-03-30 | End: 2023-10-12 | Stop reason: SDUPTHER

## 2023-03-31 PROBLEM — Z13.1 SCREENING FOR DIABETES MELLITUS (DM): Status: ACTIVE | Noted: 2023-03-31

## 2023-03-31 PROBLEM — I69.991 DYSPHAGIA FOLLOWING UNSPECIFIED CEREBROVASCULAR DISEASE: Status: ACTIVE | Noted: 2023-03-31

## 2023-03-31 PROBLEM — R13.10 DYSPHAGIA: Status: ACTIVE | Noted: 2023-03-31

## 2023-03-31 PROBLEM — E11.9 DM (DIABETES MELLITUS): Status: ACTIVE | Noted: 2023-03-31

## 2023-03-31 NOTE — PROGRESS NOTES
Subjective:       Patient ID: Heydi Ceja is a 82 y.o. female.    Chief Complaint: Follow-up    Follow-up  Associated symptoms include nausea. Pertinent negatives include no abdominal pain, chest pain, fatigue or fever.   .  PATIENT PRESENTS FOR MULTIPLE PROBLEMS HISTORY OF HYPOTHYROIDISM, MAJOR DEPRESSIVE DISORDER, HISTORY OF BREAST CA, SHE ALSO COMPLAINS OF UPPER RESPIRATORY TRACT SYMPTOMS ON FOR THE LAST SOME 2-3 WEEKS.  PATIENT ALSO STATES THAT SHE IS HAVING DIFFICULTY SWALLOWING.  SHE COMPLAINS OF CHRONIC NAUSEA.  ALSO COMPLAINS OF A FOLLOW-UP HOLTER WHEN SHE URINATES.    THE PLAN SWALLOW EVAL, PHENERGAN FOR HER NAUSEA, MACROBID FOR HER UTI.    SINCE SHE HAS HYPOTHYROIDISM WILL CHECK A TSH, AND ALSO CARE GAPS SCREENING TEST WE NEED TO ORDER HEMOGLOBIN A1C.    WE ARE GOING TO REFILL SEVERAL MEDICATIONS WHICH WILL INCLUDE LEVOTHYROXINE, ZOLOFT, AND TRAZODONE.    Current Medications:    Current Outpatient Medications:     azelastine (ASTELIN) 137 mcg (0.1 %) nasal spray, 1 spray (137 mcg total) by Nasal route 2 (two) times daily., Disp: 30 mL, Rfl: 5    mupirocin (BACTROBAN) 2 % ointment, Apply to affected areas three times daily for 7 days, Disp: 1 g, Rfl: 3    pimecrolimus (ELIDEL) 1 % cream, Apply to affected area twice daily., Disp: 100 g, Rfl: 1    HYDROcodone-acetaminophen (NORCO) 7.5-325 mg per tablet, Take 1 tablet by mouth every 6 (six) hours as needed for Pain. (Patient not taking: Reported on 3/29/2023), Disp: 15 tablet, Rfl: 0    levothyroxine (SYNTHROID) 125 MCG tablet, Take 1 tablet (125 mcg total) by mouth before breakfast., Disp: 30 tablet, Rfl: 1    loratadine (CLARITIN) 10 mg tablet, Take 1 tablet (10 mg total) by mouth daily as needed for Allergies. (Patient not taking: Reported on 3/29/2023), Disp: 30 tablet, Rfl: 1    nitrofurantoin, macrocrystal-monohydrate, (MACROBID) 100 MG capsule, Take 1 capsule (100 mg total) by mouth 2 (two) times daily., Disp: 10 capsule, Rfl: 0    promethazine  "(PHENERGAN) 25 MG tablet, Take 1 tablet (25 mg total) by mouth 3 (three) times daily as needed for Nausea., Disp: 60 tablet, Rfl: 1    sertraline (ZOLOFT) 100 MG tablet, Take 1.5 tablets (150 mg total) by mouth once daily., Disp: 135 tablet, Rfl: 1    traZODone (DESYREL) 150 MG tablet, Take 1 tablet (150 mg total) by mouth nightly., Disp: 90 tablet, Rfl: 1    triamcinolone acetonide 0.025% (KENALOG) 0.025 % Oint, Apply to affected area twice daily, tapering with improvement (Patient not taking: Reported on 3/29/2023), Disp: 80 g, Rfl: 1           Review of Systems   Constitutional:  Positive for appetite change. Negative for fatigue and fever.   HENT:  Positive for postnasal drip.    Respiratory:  Negative for shortness of breath.    Cardiovascular:  Negative for chest pain.   Gastrointestinal:  Positive for nausea. Negative for abdominal pain and constipation.   Endocrine: Negative for polydipsia, polyphagia and polyuria.   Genitourinary:  Negative for difficulty urinating, frequency and hot flashes.   Allergic/Immunologic: Negative for environmental allergies.   Neurological:  Negative for dizziness and light-headedness.   Psychiatric/Behavioral:  Negative for agitation.               Vitals:    03/29/23 1606   BP: 118/80   BP Location: Right arm   Patient Position: Sitting   BP Method: Large (Automatic)   Pulse: 105   Resp: 18   Temp: 97.8 °F (36.6 °C)   TempSrc: Temporal   SpO2: 95%   Weight: 66.2 kg (146 lb)   Height: 5' 6" (1.676 m)        Physical Exam  Vitals and nursing note reviewed.   Constitutional:       Appearance: Normal appearance.   Cardiovascular:      Rate and Rhythm: Normal rate and regular rhythm.      Pulses: Normal pulses.      Heart sounds: Normal heart sounds.   Pulmonary:      Effort: Pulmonary effort is normal.      Breath sounds: Normal breath sounds.   Abdominal:      General: Abdomen is flat. Bowel sounds are normal.      Palpations: Abdomen is soft.   Musculoskeletal:         General: " Normal range of motion.   Skin:     General: Skin is warm and dry.   Neurological:      General: No focal deficit present.      Mental Status: She is alert and oriented to person, place, and time. Mental status is at baseline.         Last Labs:     No visits with results within 1 Month(s) from this visit.   Latest known visit with results is:   Office Visit on 12/08/2022   Component Date Value    Culture, Wound/Abscess 12/08/2022 No Growth        Last Imaging:  CT Chest Abdomen Pelvis With Contrast (xpd)  Narrative: EXAMINATION:  CT CHEST ABDOMEN PELVIS WITH CONTRAST (XPD)    CLINICAL HISTORY:  Malignant neoplasm of unspecified site of unspecified female breastiv contrast only;    TECHNIQUE:  Axial images through the chest, abdomen and pelvis were obtained with the use of IV contrast. Multiplanar reformation.    COMPARISON:  7/22/22    FINDINGS:  Chest:    Lower neck: Normal    Lungs/pleura: Lungs are clear    Mediastinum: No lymphadenopathy    Chest wall: There is skin thickening of the left breast.  Ill-defined hypoattenuation located within the lateral left breast could be related to post treatment change.  Correlate with mammography.  Left axillary prem dissection.    Bones: No destructive lesions.  Degenerative change.    Abdomen/pelvis:    Hepatobiliary:Hypoattenuating lesions adjacent to the falciform ligament could represent focal fat, series 7 of image 28.  Gallbladder removed.  Biliary system is not dilated.    Pancreas: Normal    Spleen: Spleen is enlarged measuring 13 cm.    Adrenal Glands/genitourinary: Small simple cyst left kidney measuring 1.6 cm.    Large/small-bowel: Normal    Lymph nodes: Normal    Peritoneum/retroperitoneum: Normal    Bones: No destructive lesions.  Degenerative change.    Abdominal wall: Normal  Impression: There is skin thickening of the left breast, possibly related to post treatment change.  Ill-defined hypoattenuation located within the lateral left breast could be related  to post treatment change. Correlate with mammography.    No evidence of metastatic disease.    Splenomegaly.    Electronically signed by: Samuel Levy  Date:    10/05/2022  Time:    10:58         **Labs and x-rays personally reviewed by me    ** reviewed      Objective:        Assessment:       1. Hypothyroidism, unspecified type  TSH      2. Depression, unspecified depression type  sertraline (ZOLOFT) 100 MG tablet      3. Insomnia, unspecified type  traZODone (DESYREL) 150 MG tablet    TSH      4. Screening for diabetes mellitus (DM)  Hemoglobin A1C      5. Dysphagia, unspecified type  Fl Modified Barium Swallow Speech    SLP video swallow      6. DM (diabetes mellitus)  Hemoglobin A1C      7. Dysphagia following unspecified cerebrovascular disease  Fl Modified Barium Swallow Speech           Plan:         [unfilled]

## 2023-07-03 PROBLEM — Z13.1 SCREENING FOR DIABETES MELLITUS (DM): Status: RESOLVED | Noted: 2023-03-31 | Resolved: 2023-07-03

## 2023-07-11 ENCOUNTER — OFFICE VISIT (OUTPATIENT)
Dept: SURGERY | Facility: CLINIC | Age: 83
End: 2023-07-11
Attending: SURGERY
Payer: MEDICARE

## 2023-07-11 ENCOUNTER — CLINICAL SUPPORT (OUTPATIENT)
Dept: CARDIOLOGY | Facility: CLINIC | Age: 83
End: 2023-07-11
Attending: SURGERY
Payer: MEDICARE

## 2023-07-11 VITALS — WEIGHT: 138 LBS | HEIGHT: 66 IN | BODY MASS INDEX: 22.18 KG/M2

## 2023-07-11 DIAGNOSIS — L98.9 SKIN LESION: ICD-10-CM

## 2023-07-11 DIAGNOSIS — L98.9 SKIN LESION: Primary | ICD-10-CM

## 2023-07-11 PROCEDURE — 93010 ELECTROCARDIOGRAM REPORT: CPT | Mod: S$PBB,,, | Performed by: INTERNAL MEDICINE

## 2023-07-11 PROCEDURE — 93005 ELECTROCARDIOGRAM TRACING: CPT | Mod: PBBFAC | Performed by: INTERNAL MEDICINE

## 2023-07-11 PROCEDURE — 99212 OFFICE O/P EST SF 10 MIN: CPT | Mod: PBBFAC

## 2023-07-11 PROCEDURE — 99215 OFFICE O/P EST HI 40 MIN: CPT | Mod: S$PBB,,, | Performed by: SURGERY

## 2023-07-11 PROCEDURE — 93010 EKG 12-LEAD: ICD-10-PCS | Mod: S$PBB,,, | Performed by: INTERNAL MEDICINE

## 2023-07-11 PROCEDURE — 99215 PR OFFICE/OUTPT VISIT, EST, LEVL V, 40-54 MIN: ICD-10-PCS | Mod: S$PBB,,, | Performed by: SURGERY

## 2023-07-11 PROCEDURE — 99214 OFFICE O/P EST MOD 30 MIN: CPT | Mod: PBBFAC | Performed by: SURGERY

## 2023-07-11 RX ORDER — ANASTROZOLE 1 MG/1
1 TABLET ORAL
COMMUNITY
Start: 2023-05-04

## 2023-07-11 NOTE — PATIENT INSTRUCTIONS
[facility]  [address] [place] [address]      YOUR SURGERY DATE IS 7/24/23    LABWORK AND EKG IS SCHEDULED FOR today. PLEASE SIGN IN ON 1ST FLOOR   OF THE [facility] FOR LAB.  EKG IS LOCATED ON 2ND FLOOR.      DO NOT EAT OR DRINK ANYTHING AFTER MIDNIGHT BEFORE YOU SURGERY    BRING ALL MEDICATIONS YOU ARE CURRENTLY TAKING WITH YOU.  IF YOU ARE TAKING  A BLOOD PRESSURE MEDICATION, TAKE YOUR BLOOD PRESSURE MEDICATION WITH A   SIP OF WATER WHEN YOU GET UP THE MORNING OF SURGERY.     YOU MUST PRE-ADMIT AT THE FOLLOWING WEBSITE:  WEBSITE: [email or web address]  PASSWORD: [email or web address]    A STAFF MEMBER FROM THE [facility] WILL CALL YOU THE DAY BEFORE  SURGERY TO LET YOU KNOW WHAT TIME TO ARRIVE THE MORNING OF SURGERY.  IF YOU HAVE NOT HEARD FROM THEM BY 4 P.M. THE DAY BEFORE YOUR SURGERY,   PLEASE CALL THEM [phone].      IF YOU HAVE ANY QUESTIONS YOU MAY CONTACT OUR OFFICE -[phone].

## 2023-07-12 NOTE — ASSESSMENT & PLAN NOTE
Discussed removal buttocks lesions in OR due to size and uncertainty of diagnosis.  In addition, she has a history of breast cancer, and is concerned about some cutaneous manifestation of metastatic breast cancer, which would be unlikely.    An excisional biopsy is planned.    R/b/i include infection, bleeding, recurrence, non healing.  She wishes to proceed.

## 2023-07-12 NOTE — PROGRESS NOTES
"Subjective:       Patient ID: Heydi Ceja is a 83 y.o. female.    Chief Complaint: Follow-up (Sore on hip)    Patient returns almost one year following partial left mastectomy and sentinel node biopsy with completion dissection.  She completed chemo and radiation.    She presents today for breast exam and for evaluation of buttocks lesions.     Follow-up      family history includes Breast cancer in her paternal aunt.  Past Medical History:   Diagnosis Date    Depression     Digestive disorder     Eczema     Hyperlipidemia     Insomnia     PONV (postoperative nausea and vomiting)     Thyroid disease       Past Surgical History:   Procedure Laterality Date    AXILLARY NODE DISSECTION Left 08/31/2022    Procedure: LYMPHADENECTOMY, AXILLARY;  Surgeon: Dany Paula MD;  Location: [facility] [place];  Service: General;  Laterality: Left;    BREAST BIOPSY Left 08/31/2022    Procedure: BIOPSY, BREAST, WITH LUMPECTOMY;  Surgeon: Dany Paula MD;  Location: [facility] OR;  Service: General;  Laterality: Left;    CHOLECYSTECTOMY      HYSTERECTOMY      JOINT REPLACEMENT      LAPAROSCOPIC CHOLECYSTECTOMY N/A 08/31/2022    Procedure: CHOLECYSTECTOMY, LAPAROSCOPIC;  Surgeon: Dany Paula MD;  Location: [facility] OR;  Service: General;  Laterality: N/A;    SENTINEL LYMPH NODE BIOPSY Left 08/31/2022    Procedure: BIOPSY, LYMPH NODE, SENTINEL;  Surgeon: Dany Paula MD;  Location: [facility] [place];  Service: General;  Laterality: Left;       reports that she has never smoked. She has never used smokeless tobacco. She reports that she does not drink alcohol and does not use drugs.     Review of Systems   All other systems reviewed and are negative.       Objective:      Ht 5' 6" (1.676 m)   Wt 62.6 kg (138 lb)   BMI 22.27 kg/m²    Physical Exam  Cardiovascular:      Rate and Rhythm: Normal rate.      Pulses: Normal pulses.      Heart sounds: No murmur heard.  Pulmonary:      Effort: Pulmonary effort is normal. "   Chest:   Breasts:     Right: No mass.      Left: No mass.   Abdominal:      Palpations: Abdomen is soft.   Musculoskeletal:         General: No swelling.   Lymphadenopathy:      Upper Body:      Right upper body: No axillary adenopathy.      Left upper body: No axillary adenopathy.   Skin:            Comments: Two ulcerated lesions lateral left buttocks, one of which has a 1.5 cm rounded soft tissue density/induration deep to it   Neurological:      Mental Status: She is alert.   Psychiatric:         Mood and Affect: Mood normal.         Assessment/Plan:         Skin lesion  -     CBC Auto Differential; Future; Expected date: 07/11/2023  -     EKG 12-lead; Future; Expected date: 07/16/2023         Problem List Items Addressed This Visit          Derm    Skin lesion - Primary    Overview     With associated soft tissue mass          Current Assessment & Plan     Discussed removal to lesion in OR due to size and uncertainty of diagnosis.  In addition, she has a history of breast cancer, and is concerned about some cutaneous manifestation of metastatic breast cancer, which would be unlikely.    An excisional biopsy is planned.    R/b/i include infection, bleeding, recurrence, non healing.  She wishes to proceed.          Relevant Orders    CBC Auto Differential (Completed)    EKG 12-lead

## 2023-07-21 ENCOUNTER — OFFICE VISIT (OUTPATIENT)
Dept: SURGERY | Facility: CLINIC | Age: 83
End: 2023-07-21
Attending: SURGERY
Payer: MEDICARE

## 2023-07-21 DIAGNOSIS — L98.9 SKIN LESION: Primary | ICD-10-CM

## 2023-07-21 PROCEDURE — 99211 OFF/OP EST MAY X REQ PHY/QHP: CPT | Mod: S$PBB,,, | Performed by: SURGERY

## 2023-07-21 PROCEDURE — 99211 PR OFFICE/OUTPT VISIT, EST, LEVL I: ICD-10-PCS | Mod: S$PBB,,, | Performed by: SURGERY

## 2023-07-21 PROCEDURE — 99213 OFFICE O/P EST LOW 20 MIN: CPT | Mod: PBBFAC | Performed by: SURGERY

## 2023-07-21 NOTE — PROGRESS NOTES
Subjective:       Patient ID: Heydi Ceja is a 83 y.o. female.    Chief Complaint: Lesion (Check lesions on buttock)    Patient was seen recently for evaluation of skin lesion of buttocks.  She was scheduled for surgery.  She returns to be evaluated, because lesion has healed and is almost gone    Follow-up      family history includes Breast cancer in her paternal aunt.  Past Medical History:   Diagnosis Date    Depression     Digestive disorder     Eczema     Hyperlipidemia     Insomnia     PONV (postoperative nausea and vomiting)     Thyroid disease       Past Surgical History:   Procedure Laterality Date    AXILLARY NODE DISSECTION Left 08/31/2022    Procedure: LYMPHADENECTOMY, AXILLARY;  Surgeon: Dany Paula MD;  Location: [facility];  Service: General;  Laterality: Left;    BREAST BIOPSY Left 08/31/2022    Procedure: BIOPSY, BREAST, WITH LUMPECTOMY;  Surgeon: Dany Paula MD;  Location: [facility] OR;  Service: General;  Laterality: Left;    CHOLECYSTECTOMY      HYSTERECTOMY      JOINT REPLACEMENT      LAPAROSCOPIC CHOLECYSTECTOMY N/A 08/31/2022    Procedure: CHOLECYSTECTOMY, LAPAROSCOPIC;  Surgeon: Dany Paula MD;  Location: [facility];  Service: General;  Laterality: N/A;    SENTINEL LYMPH NODE BIOPSY Left 08/31/2022    Procedure: BIOPSY, LYMPH NODE, SENTINEL;  Surgeon: Dany Paula MD;  Location: [facility];  Service: General;  Laterality: Left;       reports that she has never smoked. She has never used smokeless tobacco. She reports that she does not drink alcohol and does not use drugs.     Review of Systems   All other systems reviewed and are negative.       Objective:      There were no vitals taken for this visit.   Physical Exam  Cardiovascular:      Rate and Rhythm: Normal rate.   Pulmonary:      Effort: Pulmonary effort is normal.   Skin:     Comments: Small skin lesion of buttocks has almost healed. Nodule deep to the lesion has mostly resolved.    Neurological:      Mental  Status: She is alert.         Assessment/Plan:         Skin lesion         Problem List Items Addressed This Visit          Derm    Skin lesion - Primary    Overview     With associated soft tissue mass          Current Assessment & Plan     Healing; patient wishes to delay/cancel surgery    Surgery canceled due to improvement in lesion, with near-resolution.

## 2023-07-21 NOTE — ASSESSMENT & PLAN NOTE
Healing; patient wishes to delay/cancel surgery    Surgery canceled due to improvement in lesion, with near-resolution.

## 2023-09-09 DIAGNOSIS — Z71.89 COMPLEX CARE COORDINATION: ICD-10-CM

## 2023-09-20 ENCOUNTER — HOSPITAL ENCOUNTER (OUTPATIENT)
Dept: RADIOLOGY | Facility: HOSPITAL | Age: 83
Discharge: HOME | End: 2023-09-20
Attending: RADIOLOGY
Payer: MEDICARE

## 2023-09-20 DIAGNOSIS — N64.4 BREAST PAIN: ICD-10-CM

## 2023-09-20 DIAGNOSIS — Z09 FOLLOW-UP EXAM, 3-6 MONTHS SINCE PREVIOUS EXAM: ICD-10-CM

## 2023-09-20 PROCEDURE — 76641 US BREAST BILATERAL COMPLETE: ICD-10-PCS | Mod: 26,50,, | Performed by: RADIOLOGY

## 2023-09-20 PROCEDURE — 76641 ULTRASOUND BREAST COMPLETE: CPT | Mod: 26,50,, | Performed by: RADIOLOGY

## 2023-09-20 PROCEDURE — 77066 MAMMO DIGITAL DIAGNOSTIC BILAT: ICD-10-PCS | Mod: 26,,, | Performed by: RADIOLOGY

## 2023-09-20 PROCEDURE — 77066 DX MAMMO INCL CAD BI: CPT | Mod: 26,,, | Performed by: RADIOLOGY

## 2023-09-20 PROCEDURE — 76641 ULTRASOUND BREAST COMPLETE: CPT | Mod: TC,50

## 2023-09-20 PROCEDURE — 77066 DX MAMMO INCL CAD BI: CPT | Mod: TC

## 2023-10-12 ENCOUNTER — OFFICE VISIT (OUTPATIENT)
Dept: FAMILY MEDICINE | Facility: CLINIC | Age: 83
End: 2023-10-12
Payer: MEDICARE

## 2023-10-12 VITALS
SYSTOLIC BLOOD PRESSURE: 100 MMHG | OXYGEN SATURATION: 99 % | TEMPERATURE: 98 F | HEART RATE: 78 BPM | DIASTOLIC BLOOD PRESSURE: 60 MMHG | BODY MASS INDEX: 23.65 KG/M2 | RESPIRATION RATE: 18 BRPM | WEIGHT: 147.19 LBS | HEIGHT: 66 IN

## 2023-10-12 DIAGNOSIS — E03.9 HYPOTHYROIDISM, UNSPECIFIED TYPE: Primary | ICD-10-CM

## 2023-10-12 DIAGNOSIS — G47.00 INSOMNIA, UNSPECIFIED TYPE: ICD-10-CM

## 2023-10-12 DIAGNOSIS — F32.A DEPRESSION, UNSPECIFIED DEPRESSION TYPE: ICD-10-CM

## 2023-10-12 LAB
ANION GAP SERPL CALCULATED.3IONS-SCNC: 7 MMOL/L (ref 7–16)
BASOPHILS # BLD AUTO: 0.02 K/UL (ref 0–0.2)
BASOPHILS NFR BLD AUTO: 0.4 % (ref 0–1)
BUN SERPL-MCNC: 15 MG/DL (ref 7–18)
BUN/CREAT SERPL: 13 (ref 6–20)
CALCIUM SERPL-MCNC: 8.9 MG/DL (ref 8.5–10.1)
CHLORIDE SERPL-SCNC: 106 MMOL/L (ref 98–107)
CO2 SERPL-SCNC: 32 MMOL/L (ref 21–32)
CREAT SERPL-MCNC: 1.13 MG/DL (ref 0.55–1.02)
DIFFERENTIAL METHOD BLD: ABNORMAL
EGFR (NO RACE VARIABLE) (RUSH/TITUS): 49 ML/MIN/1.73M2
EOSINOPHIL # BLD AUTO: 0.23 K/UL (ref 0–0.5)
EOSINOPHIL NFR BLD AUTO: 4.4 % (ref 1–4)
ERYTHROCYTE [DISTWIDTH] IN BLOOD BY AUTOMATED COUNT: 14 % (ref 11.5–14.5)
GLUCOSE SERPL-MCNC: 103 MG/DL (ref 74–106)
HCT VFR BLD AUTO: 37.4 % (ref 38–47)
HGB BLD-MCNC: 12.6 G/DL (ref 12–16)
IMM GRANULOCYTES # BLD AUTO: 0.01 K/UL (ref 0–0.04)
IMM GRANULOCYTES NFR BLD: 0.2 % (ref 0–0.4)
LYMPHOCYTES # BLD AUTO: 1.23 K/UL (ref 1–4.8)
LYMPHOCYTES NFR BLD AUTO: 23.6 % (ref 27–41)
MCH RBC QN AUTO: 31.2 PG (ref 27–31)
MCHC RBC AUTO-ENTMCNC: 33.7 G/DL (ref 32–36)
MCV RBC AUTO: 92.6 FL (ref 80–96)
MONOCYTES # BLD AUTO: 0.35 K/UL (ref 0–0.8)
MONOCYTES NFR BLD AUTO: 6.7 % (ref 2–6)
MPC BLD CALC-MCNC: 9.6 FL (ref 9.4–12.4)
NEUTROPHILS # BLD AUTO: 3.38 K/UL (ref 1.8–7.7)
NEUTROPHILS NFR BLD AUTO: 64.7 % (ref 53–65)
NRBC # BLD AUTO: 0 X10E3/UL
NRBC, AUTO (.00): 0 %
PLATELET # BLD AUTO: 122 K/UL (ref 150–400)
POTASSIUM SERPL-SCNC: 4.3 MMOL/L (ref 3.5–5.1)
PREALB SERPL NEPH-MCNC: 31 MG/DL (ref 20–40)
RBC # BLD AUTO: 4.04 M/UL (ref 4.2–5.4)
SODIUM SERPL-SCNC: 141 MMOL/L (ref 136–145)
TSH SERPL DL<=0.005 MIU/L-ACNC: 21.2 UIU/ML (ref 0.36–3.74)
WBC # BLD AUTO: 5.22 K/UL (ref 4.5–11)

## 2023-10-12 PROCEDURE — 84443 ASSAY THYROID STIM HORMONE: CPT | Mod: ,,, | Performed by: CLINICAL MEDICAL LABORATORY

## 2023-10-12 PROCEDURE — 85025 CBC WITH DIFFERENTIAL: ICD-10-PCS | Mod: ,,, | Performed by: CLINICAL MEDICAL LABORATORY

## 2023-10-12 PROCEDURE — 99214 OFFICE O/P EST MOD 30 MIN: CPT | Mod: ,,, | Performed by: INTERNAL MEDICINE

## 2023-10-12 PROCEDURE — 80048 BASIC METABOLIC PANEL: ICD-10-PCS | Mod: ,,, | Performed by: CLINICAL MEDICAL LABORATORY

## 2023-10-12 PROCEDURE — 84134 PREALBUMIN: ICD-10-PCS | Mod: ,,, | Performed by: CLINICAL MEDICAL LABORATORY

## 2023-10-12 PROCEDURE — 80048 BASIC METABOLIC PNL TOTAL CA: CPT | Mod: ,,, | Performed by: CLINICAL MEDICAL LABORATORY

## 2023-10-12 PROCEDURE — 99214 PR OFFICE/OUTPT VISIT, EST, LEVL IV, 30-39 MIN: ICD-10-PCS | Mod: ,,, | Performed by: INTERNAL MEDICINE

## 2023-10-12 PROCEDURE — 84134 ASSAY OF PREALBUMIN: CPT | Mod: ,,, | Performed by: CLINICAL MEDICAL LABORATORY

## 2023-10-12 PROCEDURE — 84443 TSH: ICD-10-PCS | Mod: ,,, | Performed by: CLINICAL MEDICAL LABORATORY

## 2023-10-12 PROCEDURE — 85025 COMPLETE CBC W/AUTO DIFF WBC: CPT | Mod: ,,, | Performed by: CLINICAL MEDICAL LABORATORY

## 2023-10-12 RX ORDER — TRAZODONE HYDROCHLORIDE 150 MG/1
150 TABLET ORAL NIGHTLY
Qty: 90 TABLET | Refills: 1 | Status: SHIPPED | OUTPATIENT
Start: 2023-10-12 | End: 2024-04-02 | Stop reason: SDUPTHER

## 2023-10-12 RX ORDER — SERTRALINE HYDROCHLORIDE 100 MG/1
150 TABLET, FILM COATED ORAL DAILY
Qty: 135 TABLET | Refills: 1 | Status: SHIPPED | OUTPATIENT
Start: 2023-10-12 | End: 2024-04-02 | Stop reason: SDUPTHER

## 2023-10-12 RX ORDER — LEVOTHYROXINE SODIUM 125 UG/1
125 TABLET ORAL
Qty: 30 TABLET | Refills: 1 | Status: SHIPPED | OUTPATIENT
Start: 2023-10-12 | End: 2023-10-24 | Stop reason: SDUPTHER

## 2023-10-16 NOTE — PROGRESS NOTES
Subjective:       Patient ID: Heydi Ceja is a 83 y.o. female.    Chief Complaint: Medication Refill and Follow-up  Patient presents with chronic hypothyroidism depressive disorder in has a history of breast cancer.  Patient also complains of poor appetite has been having some issues with the weight loss.  Today I am going to check a basic metabolic panel CBC pre-albumin and also TSH.  She is history of breast cancer status post mastectomy.  He is currently being treated for breast cancer.  She is undergone mastectomy and cholecystectomy  Will refill Synthroid 125 mics 1 p.o. q.day and Zoloft 100 mg 1 p.o. q.day and trazodone 150 mg 1 p.o. q.h.s..  Medication Refill  Pertinent negatives include no abdominal pain, chest pain, fatigue or fever.   Follow-up  Pertinent negatives include no abdominal pain, chest pain, fatigue or fever.     .    Current Medications:    Current Outpatient Medications:     anastrozole (ARIMIDEX) 1 mg Tab, Take 1 mg by mouth., Disp: , Rfl:     azelastine (ASTELIN) 137 mcg (0.1 %) nasal spray, 1 spray (137 mcg total) by Nasal route 2 (two) times daily., Disp: 30 mL, Rfl: 5    loratadine (CLARITIN) 10 mg tablet, Take 1 tablet (10 mg total) by mouth daily as needed for Allergies., Disp: 30 tablet, Rfl: 1    pimecrolimus (ELIDEL) 1 % cream, Apply to affected area twice daily., Disp: 100 g, Rfl: 1    promethazine (PHENERGAN) 25 MG tablet, Take 1 tablet (25 mg total) by mouth 3 (three) times daily as needed for Nausea., Disp: 60 tablet, Rfl: 1    HYDROcodone-acetaminophen (NORCO) 7.5-325 mg per tablet, Take 1 tablet by mouth every 6 (six) hours as needed for Pain. (Patient not taking: Reported on 3/29/2023), Disp: 15 tablet, Rfl: 0    levothyroxine (SYNTHROID) 125 MCG tablet, Take 1 tablet (125 mcg total) by mouth before breakfast., Disp: 30 tablet, Rfl: 1    mupirocin (BACTROBAN) 2 % ointment, Apply to affected areas three times daily for 7 days (Patient not taking: Reported on  "10/12/2023), Disp: 1 g, Rfl: 3    nitrofurantoin, macrocrystal-monohydrate, (MACROBID) 100 MG capsule, Take 1 capsule (100 mg total) by mouth 2 (two) times daily. (Patient not taking: Reported on 10/12/2023), Disp: 10 capsule, Rfl: 0    sertraline (ZOLOFT) 100 MG tablet, Take 1.5 tablets (150 mg total) by mouth once daily., Disp: 135 tablet, Rfl: 1    traZODone (DESYREL) 150 MG tablet, Take 1 tablet (150 mg total) by mouth nightly., Disp: 90 tablet, Rfl: 1    triamcinolone acetonide 0.025% (KENALOG) 0.025 % Oint, Apply to affected area twice daily, tapering with improvement (Patient not taking: Reported on 3/29/2023), Disp: 80 g, Rfl: 1           Review of Systems   Constitutional:  Negative for appetite change, fatigue and fever.   Respiratory:  Negative for shortness of breath.    Cardiovascular:  Negative for chest pain.   Gastrointestinal:  Negative for abdominal pain and constipation.   Endocrine: Negative for polydipsia, polyphagia and polyuria.   Genitourinary:  Negative for difficulty urinating, frequency and hot flashes.   Allergic/Immunologic: Negative for environmental allergies.   Neurological:  Negative for dizziness and light-headedness.   Psychiatric/Behavioral:  Negative for agitation.                 Vitals:    10/12/23 1402   BP: 100/60   BP Location: Right arm   Patient Position: Sitting   BP Method: Medium (Manual)   Pulse: 78   Resp: 18   Temp: 98.2 °F (36.8 °C)   TempSrc: Temporal   SpO2: 99%   Weight: 66.8 kg (147 lb 3.2 oz)   Height: 5' 6" (1.676 m)        Physical Exam  Vitals and nursing note reviewed.   Constitutional:       Appearance: Normal appearance.   Cardiovascular:      Rate and Rhythm: Normal rate and regular rhythm.      Pulses: Normal pulses.      Heart sounds: Normal heart sounds.   Pulmonary:      Effort: Pulmonary effort is normal.      Breath sounds: Normal breath sounds.   Abdominal:      General: Abdomen is flat. Bowel sounds are normal.      Palpations: Abdomen is soft. "   Musculoskeletal:         General: Normal range of motion.   Skin:     General: Skin is warm and dry.   Neurological:      General: No focal deficit present.      Mental Status: She is alert and oriented to person, place, and time. Mental status is at baseline.           Last Labs:     Office Visit on 10/12/2023   Component Date Value    Sodium 10/12/2023 141     Potassium 10/12/2023 4.3     Chloride 10/12/2023 106     CO2 10/12/2023 32     Anion Gap 10/12/2023 7     Glucose 10/12/2023 103     BUN 10/12/2023 15     Creatinine 10/12/2023 1.13 (H)     BUN/Creatinine Ratio 10/12/2023 13     Calcium 10/12/2023 8.9     eGFR 10/12/2023 49 (L)     TSH 10/12/2023 21.200 (H)     Prealbumin 10/12/2023 31     WBC 10/12/2023 5.22     RBC 10/12/2023 4.04 (L)     Hemoglobin 10/12/2023 12.6     Hematocrit 10/12/2023 37.4 (L)     MCV 10/12/2023 92.6     MCH 10/12/2023 31.2 (H)     MCHC 10/12/2023 33.7     RDW 10/12/2023 14.0     Platelet Count 10/12/2023 122 (L)     MPV 10/12/2023 9.6     Neutrophils % 10/12/2023 64.7     Lymphocytes % 10/12/2023 23.6 (L)     Monocytes % 10/12/2023 6.7 (H)     Eosinophils % 10/12/2023 4.4 (H)     Basophils % 10/12/2023 0.4     Immature Granulocytes % 10/12/2023 0.2     nRBC, Auto 10/12/2023 0.0     Neutrophils, Abs 10/12/2023 3.38     Lymphocytes, Absolute 10/12/2023 1.23     Monocytes, Absolute 10/12/2023 0.35     Eosinophils, Absolute 10/12/2023 0.23     Basophils, Absolute 10/12/2023 0.02     Immature Granulocytes, A* 10/12/2023 0.01     nRBC, Absolute 10/12/2023 0.00     Diff Type 10/12/2023 Auto        Last Imaging:  Mammo Digital Diagnostic Bilat, US Breast Bilateral Complete  Narrative: Result:  Mammo Digital Diagnostic Bilat  US Breast Bilateral Complete    History:  Patient is 83 y.o. and is seen for diagnostic imaging.  Previous lumpectomy and radiation therapy for 2.5 cm invasive cancer   diagnosed in the 12 o'clock position of the left breast in August, 2022     Films  Compared:08/01/2022     Findings:  The breasts are heterogeneously dense, which may obscure small masses. In   the posteromedial right breast 7 cm radially from the nipple there is a   low-density circumscribed 7 mm nodule which is not changed.     Interstitial edema is present in the left breast from radiation changes in   there is mild architectural distortion in the 12 o'clock position which   represents the lumpectomy site. There is significant associated   periareolar skin thickening.     A breast examination was performed in conjunction with breast ultrasound   to evaluate the lumpectomy scar in the left breast,which is located too   far posteriorly for adequate mammographic visualization.  All four breast   quadrants and the retroareolar regions were scanned. The background tissue   is homogeneous - fibroglandular.     Residual interstitial edema is seen in the left breast with discoloration.    Ultrasound of the left breast revealed a 5 x 11 x 8 mm area of distortion   beneath the lumpectomy scar in the 12 o'clock position.  No discrete   seroma is seen.  No abnormalities are seen elsewhere in the left breast or   in the left axilla.      In the right breast, in the 2 o'clock position 6 cm radially from the   nipple, there is an oval circumscribed 9 x 2.6 x 8 mm nodule likely   representing an intramammary lymph node.  This corresponds to the stable   mammographic nodule and did not correspond any abnormalities on the pre   operative MRI workup on 08/03/2022.   Impression:    Postsurgical and post radiation changes are present in the left breast but   there are no radiographic signs of malignancy. Routine yearly mammograms   are recommended.     BI-RADS CATEGORY 2 - Benign finding.     Recommendation:  Routine screening mammogram in 1 year, or as clinically indicated.    cc: Dr. Guaman   cc: Dr. King          **Labs and x-rays personally reviewed by me    ** reviewed      Objective:        Assessment:        1. Hypothyroidism, unspecified type  Basic Metabolic Panel    CBC Auto Differential    TSH    Prealbumin    Basic Metabolic Panel    CBC Auto Differential    TSH    Prealbumin      2. Depression, unspecified depression type  sertraline (ZOLOFT) 100 MG tablet      3. Insomnia, unspecified type  traZODone (DESYREL) 150 MG tablet           Plan:         [unfilled]

## 2023-10-19 ENCOUNTER — TELEPHONE (OUTPATIENT)
Dept: FAMILY MEDICINE | Facility: CLINIC | Age: 83
End: 2023-10-19
Payer: MEDICARE

## 2023-10-19 NOTE — TELEPHONE ENCOUNTER
----- Message from Jose Rodriguez MD sent at 10/16/2023  2:55 PM CDT -----  Need to see in  1 week please  abnl results     [id number] Call made to pt regarding above message; pt scheduled to come in on 10/25/23

## 2023-10-24 ENCOUNTER — OFFICE VISIT (OUTPATIENT)
Dept: FAMILY MEDICINE | Facility: CLINIC | Age: 83
End: 2023-10-24
Payer: MEDICARE

## 2023-10-24 VITALS
TEMPERATURE: 97 F | HEIGHT: 66 IN | BODY MASS INDEX: 23.24 KG/M2 | DIASTOLIC BLOOD PRESSURE: 73 MMHG | SYSTOLIC BLOOD PRESSURE: 115 MMHG | OXYGEN SATURATION: 95 % | RESPIRATION RATE: 18 BRPM | WEIGHT: 144.63 LBS | HEART RATE: 103 BPM

## 2023-10-24 DIAGNOSIS — E03.9 HYPOTHYROIDISM, UNSPECIFIED TYPE: Primary | ICD-10-CM

## 2023-10-24 PROCEDURE — 99213 OFFICE O/P EST LOW 20 MIN: CPT | Mod: ,,, | Performed by: INTERNAL MEDICINE

## 2023-10-24 PROCEDURE — 99213 PR OFFICE/OUTPT VISIT, EST, LEVL III, 20-29 MIN: ICD-10-PCS | Mod: ,,, | Performed by: INTERNAL MEDICINE

## 2023-10-24 RX ORDER — LEVOTHYROXINE SODIUM 137 UG/1
125 TABLET ORAL
Qty: 90 TABLET | Refills: 1 | Status: SHIPPED | OUTPATIENT
Start: 2023-10-24 | End: 2024-01-02 | Stop reason: SDUPTHER

## 2023-10-26 NOTE — PROGRESS NOTES
Subjective:       Patient ID: Heydi Ceja is a 83 y.o. female.    Chief Complaint: Results    HPI  .  Patient presents today with evidence of hypothyroidism.  TSH was found to be elevated.  Today I will increase the levothyroxine to 137.5 mcg 1 p.o. q.day.  Recheck a TSH in 2 months.  Current Medications:    Current Outpatient Medications:     anastrozole (ARIMIDEX) 1 mg Tab, Take 1 mg by mouth., Disp: , Rfl:     azelastine (ASTELIN) 137 mcg (0.1 %) nasal spray, 1 spray (137 mcg total) by Nasal route 2 (two) times daily., Disp: 30 mL, Rfl: 5    levothyroxine (SYNTHROID) 137 MCG Tab tablet, Take 1 tablet (137 mcg total) by mouth before breakfast., Disp: 90 tablet, Rfl: 1    loratadine (CLARITIN) 10 mg tablet, Take 1 tablet (10 mg total) by mouth daily as needed for Allergies., Disp: 30 tablet, Rfl: 1    pimecrolimus (ELIDEL) 1 % cream, Apply to affected area twice daily., Disp: 100 g, Rfl: 1    promethazine (PHENERGAN) 25 MG tablet, Take 1 tablet (25 mg total) by mouth 3 (three) times daily as needed for Nausea., Disp: 60 tablet, Rfl: 1    sertraline (ZOLOFT) 100 MG tablet, Take 1.5 tablets (150 mg total) by mouth once daily., Disp: 135 tablet, Rfl: 1    traZODone (DESYREL) 150 MG tablet, Take 1 tablet (150 mg total) by mouth nightly., Disp: 90 tablet, Rfl: 1    HYDROcodone-acetaminophen (NORCO) 7.5-325 mg per tablet, Take 1 tablet by mouth every 6 (six) hours as needed for Pain. (Patient not taking: Reported on 3/29/2023), Disp: 15 tablet, Rfl: 0    mupirocin (BACTROBAN) 2 % ointment, Apply to affected areas three times daily for 7 days (Patient not taking: Reported on 10/12/2023), Disp: 1 g, Rfl: 3    nitrofurantoin, macrocrystal-monohydrate, (MACROBID) 100 MG capsule, Take 1 capsule (100 mg total) by mouth 2 (two) times daily. (Patient not taking: Reported on 10/12/2023), Disp: 10 capsule, Rfl: 0    triamcinolone acetonide 0.025% (KENALOG) 0.025 % Oint, Apply to affected area twice daily, tapering with  "improvement (Patient not taking: Reported on 3/29/2023), Disp: 80 g, Rfl: 1           Review of Systems   Constitutional:  Negative for appetite change, fatigue and fever.   Respiratory:  Negative for shortness of breath.    Cardiovascular:  Negative for chest pain.   Gastrointestinal:  Negative for abdominal pain and constipation.   Endocrine: Negative for polydipsia, polyphagia and polyuria.   Genitourinary:  Negative for difficulty urinating, frequency and hot flashes.   Allergic/Immunologic: Negative for environmental allergies.   Neurological:  Negative for dizziness and light-headedness.   Psychiatric/Behavioral:  Negative for agitation.                 Vitals:    10/24/23 1416   BP: 115/73   BP Location: Right arm   Patient Position: Sitting   BP Method: Large (Automatic)   Pulse: 103   Resp: 18   Temp: 97.4 °F (36.3 °C)   TempSrc: Temporal   SpO2: 95%   Weight: 65.6 kg (144 lb 9.6 oz)   Height: 5' 6" (1.676 m)        Physical Exam  Vitals and nursing note reviewed.   Constitutional:       Appearance: Normal appearance.   Cardiovascular:      Rate and Rhythm: Normal rate and regular rhythm.      Pulses: Normal pulses.      Heart sounds: Normal heart sounds.   Pulmonary:      Effort: Pulmonary effort is normal.      Breath sounds: Normal breath sounds.   Abdominal:      General: Abdomen is flat. Bowel sounds are normal.      Palpations: Abdomen is soft.   Musculoskeletal:         General: Normal range of motion.   Skin:     General: Skin is warm and dry.   Neurological:      General: No focal deficit present.      Mental Status: She is alert and oriented to person, place, and time. Mental status is at baseline.           Last Labs:     Office Visit on 10/12/2023   Component Date Value    Sodium 10/12/2023 141     Potassium 10/12/2023 4.3     Chloride 10/12/2023 106     CO2 10/12/2023 32     Anion Gap 10/12/2023 7     Glucose 10/12/2023 103     BUN 10/12/2023 15     Creatinine 10/12/2023 1.13 (H)     " BUN/Creatinine Ratio 10/12/2023 13     Calcium 10/12/2023 8.9     eGFR 10/12/2023 49 (L)     TSH 10/12/2023 21.200 (H)     Prealbumin 10/12/2023 31     WBC 10/12/2023 5.22     RBC 10/12/2023 4.04 (L)     Hemoglobin 10/12/2023 12.6     Hematocrit 10/12/2023 37.4 (L)     MCV 10/12/2023 92.6     MCH 10/12/2023 31.2 (H)     MCHC 10/12/2023 33.7     RDW 10/12/2023 14.0     Platelet Count 10/12/2023 122 (L)     MPV 10/12/2023 9.6     Neutrophils % 10/12/2023 64.7     Lymphocytes % 10/12/2023 23.6 (L)     Monocytes % 10/12/2023 6.7 (H)     Eosinophils % 10/12/2023 4.4 (H)     Basophils % 10/12/2023 0.4     Immature Granulocytes % 10/12/2023 0.2     nRBC, Auto 10/12/2023 0.0     Neutrophils, Abs 10/12/2023 3.38     Lymphocytes, Absolute 10/12/2023 1.23     Monocytes, Absolute 10/12/2023 0.35     Eosinophils, Absolute 10/12/2023 0.23     Basophils, Absolute 10/12/2023 0.02     Immature Granulocytes, A* 10/12/2023 0.01     nRBC, Absolute 10/12/2023 0.00     Diff Type 10/12/2023 Auto        Last Imaging:  Mammo Digital Diagnostic Bilat, US Breast Bilateral Complete  Narrative: Result:  Mammo Digital Diagnostic Bilat  US Breast Bilateral Complete    History:  Patient is 83 y.o. and is seen for diagnostic imaging.  Previous lumpectomy and radiation therapy for 2.5 cm invasive cancer   diagnosed in the 12 o'clock position of the left breast in August, 2022     Films Compared:08/01/2022     Findings:  The breasts are heterogeneously dense, which may obscure small masses. In   the posteromedial right breast 7 cm radially from the nipple there is a   low-density circumscribed 7 mm nodule which is not changed.     Interstitial edema is present in the left breast from radiation changes in   there is mild architectural distortion in the 12 o'clock position which   represents the lumpectomy site. There is significant associated   periareolar skin thickening.     A breast examination was performed in conjunction with breast ultrasound    to evaluate the lumpectomy scar in the left breast,which is located too   far posteriorly for adequate mammographic visualization.  All four breast   quadrants and the retroareolar regions were scanned. The background tissue   is homogeneous - fibroglandular.     Residual interstitial edema is seen in the left breast with discoloration.    Ultrasound of the left breast revealed a 5 x 11 x 8 mm area of distortion   beneath the lumpectomy scar in the 12 o'clock position.  No discrete   seroma is seen.  No abnormalities are seen elsewhere in the left breast or   in the left axilla.      In the right breast, in the 2 o'clock position 6 cm radially from the   nipple, there is an oval circumscribed 9 x 2.6 x 8 mm nodule likely   representing an intramammary lymph node.  This corresponds to the stable   mammographic nodule and did not correspond any abnormalities on the pre   operative MRI workup on 08/03/2022.   Impression:    Postsurgical and post radiation changes are present in the left breast but   there are no radiographic signs of malignancy. Routine yearly mammograms   are recommended.     BI-RADS CATEGORY 2 - Benign finding.     Recommendation:  Routine screening mammogram in 1 year, or as clinically indicated.    cc: Dr. Guaman   cc: Dr. King          **Labs and x-rays personally reviewed by me    ** reviewed      Objective:        Assessment:       1. Hypothyroidism, unspecified type  TSH           Plan:         [unfilled]

## 2024-01-02 ENCOUNTER — OFFICE VISIT (OUTPATIENT)
Dept: PRIMARY CARE CLINIC | Facility: CLINIC | Age: 84
End: 2024-01-02
Payer: MEDICARE

## 2024-01-02 VITALS
HEIGHT: 66 IN | WEIGHT: 154 LBS | DIASTOLIC BLOOD PRESSURE: 76 MMHG | HEART RATE: 104 BPM | TEMPERATURE: 98 F | BODY MASS INDEX: 24.75 KG/M2 | OXYGEN SATURATION: 98 % | SYSTOLIC BLOOD PRESSURE: 118 MMHG

## 2024-01-02 DIAGNOSIS — J06.9 ACUTE UPPER RESPIRATORY INFECTION: Primary | ICD-10-CM

## 2024-01-02 DIAGNOSIS — E03.8 OTHER SPECIFIED HYPOTHYROIDISM: ICD-10-CM

## 2024-01-02 PROCEDURE — 99213 OFFICE O/P EST LOW 20 MIN: CPT | Mod: ,,, | Performed by: NURSE PRACTITIONER

## 2024-01-02 RX ORDER — AZELASTINE 1 MG/ML
1 SPRAY, METERED NASAL 2 TIMES DAILY
Qty: 30 ML | Refills: 5 | Status: SHIPPED | OUTPATIENT
Start: 2024-01-02 | End: 2025-01-01

## 2024-01-02 RX ORDER — LEVOTHYROXINE SODIUM 137 UG/1
125 TABLET ORAL
Qty: 90 TABLET | Refills: 1 | Status: SHIPPED | OUTPATIENT
Start: 2024-01-02

## 2024-01-02 RX ORDER — AZITHROMYCIN 250 MG/1
TABLET, FILM COATED ORAL
Qty: 6 TABLET | Refills: 0 | Status: SHIPPED | OUTPATIENT
Start: 2024-01-02

## 2024-01-02 NOTE — PROGRESS NOTES
Subjective     Patient ID: Heydi Ceja is a 83 y.o. female.    Chief Complaint: Cough (Pt Complains of cough and nasal congestion x 1 week.)    Pt presents with cough and green sinus drainage x 1 week.      Review of Systems   Constitutional:  Negative for activity change, appetite change, chills, fatigue and fever.   HENT:  Positive for nasal congestion, rhinorrhea and sinus pressure/congestion. Negative for ear discharge, nosebleeds, postnasal drip, sneezing, sore throat and tinnitus.    Eyes:  Negative for pain, discharge, redness and itching.   Respiratory:  Positive for cough. Negative for choking, chest tightness, shortness of breath and wheezing.    Cardiovascular:  Negative for chest pain.   Gastrointestinal:  Negative for abdominal distention, abdominal pain, blood in stool, change in bowel habit, constipation, diarrhea, nausea and vomiting.   Genitourinary:  Negative for decreased urine volume, dysuria, flank pain and frequency.   Musculoskeletal:  Negative for back pain and gait problem.   Integumentary:  Negative for wound, breast mass and breast discharge.   Allergic/Immunologic: Negative for immunocompromised state.   Neurological:  Negative for dizziness, light-headedness and headaches.   Psychiatric/Behavioral:  Negative for agitation, behavioral problems and hallucinations.    Breast: Negative for mass         Objective     Physical Exam  Vitals and nursing note reviewed.   Constitutional:       Appearance: Normal appearance.   HENT:      Head: Normocephalic.      Right Ear: Tympanic membrane normal.      Left Ear: Tympanic membrane normal.      Nose: Congestion present.      Mouth/Throat:      Mouth: Mucous membranes are moist.   Eyes:      Conjunctiva/sclera: Conjunctivae normal.   Cardiovascular:      Rate and Rhythm: Normal rate and regular rhythm.      Heart sounds: Normal heart sounds.   Pulmonary:      Effort: Pulmonary effort is normal.      Breath sounds: Normal breath sounds.    Musculoskeletal:         General: Normal range of motion.   Neurological:      Mental Status: She is alert and oriented to person, place, and time.   Psychiatric:         Mood and Affect: Mood normal.         Behavior: Behavior normal.            Assessment and Plan     1. Acute upper respiratory infection  -     azelastine (ASTELIN) 137 mcg (0.1 %) nasal spray; 1 spray (137 mcg total) by Nasal route 2 (two) times daily.  Dispense: 30 mL; Refill: 5  -     azithromycin (Z-HEATHER) 250 MG tablet; 2 tablets on day 1 and 1 tablet on days 2-5  Dispense: 6 tablet; Refill: 0    2. Other specified hypothyroidism  -     levothyroxine (SYNTHROID) 137 MCG Tab tablet; Take 1 tablet (137 mcg total) by mouth before breakfast.  Dispense: 90 tablet; Refill: 1        Pt states she will make an appointment for labs and to establish care. Notify clinic if symptoms worsen or persist.          Follow up if symptoms worsen or fail to improve.

## 2024-04-02 ENCOUNTER — OFFICE VISIT (OUTPATIENT)
Dept: PRIMARY CARE CLINIC | Facility: CLINIC | Age: 84
End: 2024-04-02
Payer: MEDICARE

## 2024-04-02 VITALS
HEART RATE: 106 BPM | WEIGHT: 147 LBS | SYSTOLIC BLOOD PRESSURE: 118 MMHG | TEMPERATURE: 98 F | DIASTOLIC BLOOD PRESSURE: 80 MMHG | OXYGEN SATURATION: 96 % | HEIGHT: 66 IN | BODY MASS INDEX: 23.63 KG/M2

## 2024-04-02 DIAGNOSIS — E03.8 OTHER SPECIFIED HYPOTHYROIDISM: Primary | ICD-10-CM

## 2024-04-02 DIAGNOSIS — C77.3 SECONDARY AND UNSPECIFIED MALIGNANT NEOPLASM OF AXILLA AND UPPER LIMB LYMPH NODES: ICD-10-CM

## 2024-04-02 DIAGNOSIS — F32.A DEPRESSION, UNSPECIFIED DEPRESSION TYPE: ICD-10-CM

## 2024-04-02 DIAGNOSIS — D70.1 AGRANULOCYTOSIS SECONDARY TO CANCER CHEMOTHERAPY (CODE): ICD-10-CM

## 2024-04-02 DIAGNOSIS — G47.00 INSOMNIA, UNSPECIFIED TYPE: ICD-10-CM

## 2024-04-02 DIAGNOSIS — Z23 NEED FOR VACCINATION: ICD-10-CM

## 2024-04-02 DIAGNOSIS — D69.6 THROMBOCYTOPENIA, UNSPECIFIED: ICD-10-CM

## 2024-04-02 PROBLEM — K80.20 GALLSTONES: Status: RESOLVED | Noted: 2022-08-14 | Resolved: 2024-04-02

## 2024-04-02 PROBLEM — E11.9 DM (DIABETES MELLITUS): Status: RESOLVED | Noted: 2023-03-31 | Resolved: 2024-04-02

## 2024-04-02 PROBLEM — Z85.3 HISTORY OF BREAST CANCER: Status: RESOLVED | Noted: 2023-01-29 | Resolved: 2024-04-02

## 2024-04-02 PROBLEM — R13.10 DYSPHAGIA: Status: RESOLVED | Noted: 2023-03-31 | Resolved: 2024-04-02

## 2024-04-02 PROBLEM — L98.9 SKIN LESION: Status: RESOLVED | Noted: 2023-07-11 | Resolved: 2024-04-02

## 2024-04-02 PROBLEM — N63.20 LEFT BREAST MASS: Status: RESOLVED | Noted: 2022-08-14 | Resolved: 2024-04-02

## 2024-04-02 PROBLEM — I69.991 DYSPHAGIA FOLLOWING UNSPECIFIED CEREBROVASCULAR DISEASE: Status: RESOLVED | Noted: 2023-03-31 | Resolved: 2024-04-02

## 2024-04-02 PROCEDURE — 90677 PCV20 VACCINE IM: CPT | Mod: ,,, | Performed by: NURSE PRACTITIONER

## 2024-04-02 PROCEDURE — 99214 OFFICE O/P EST MOD 30 MIN: CPT | Mod: ,,, | Performed by: NURSE PRACTITIONER

## 2024-04-02 PROCEDURE — G0009 ADMIN PNEUMOCOCCAL VACCINE: HCPCS | Mod: ,,, | Performed by: NURSE PRACTITIONER

## 2024-04-02 RX ORDER — TRAZODONE HYDROCHLORIDE 150 MG/1
150 TABLET ORAL NIGHTLY
Qty: 90 TABLET | Refills: 3 | Status: SHIPPED | OUTPATIENT
Start: 2024-04-02

## 2024-04-02 RX ORDER — SERTRALINE HYDROCHLORIDE 100 MG/1
150 TABLET, FILM COATED ORAL DAILY
Qty: 135 TABLET | Refills: 3 | Status: SHIPPED | OUTPATIENT
Start: 2024-04-02

## 2024-04-02 RX ORDER — LEVOTHYROXINE SODIUM 137 UG/1
125 TABLET ORAL
Qty: 90 TABLET | Refills: 3 | Status: SHIPPED | OUTPATIENT
Start: 2024-04-02

## 2024-04-02 NOTE — PROGRESS NOTES
Subjective     Patient ID: Heydi Ceja is a 83 y.o. female.    Chief Complaint: Establish Care    Pt presents to establish care.      Review of Systems   Constitutional:  Negative for activity change, appetite change, chills, fatigue and fever.   HENT:  Negative for nasal congestion, ear discharge, nosebleeds, postnasal drip, rhinorrhea, sinus pressure/congestion, sneezing, sore throat and tinnitus.    Eyes:  Negative for pain, discharge, redness and itching.   Respiratory:  Negative for cough, choking, chest tightness, shortness of breath and wheezing.    Cardiovascular:  Negative for chest pain.   Gastrointestinal:  Negative for abdominal distention, abdominal pain, blood in stool, change in bowel habit, constipation, diarrhea, nausea and vomiting.   Genitourinary:  Negative for decreased urine volume, dysuria, flank pain and frequency.   Musculoskeletal:  Negative for back pain and gait problem.   Integumentary:  Negative for wound, breast mass and breast discharge.   Allergic/Immunologic: Negative for immunocompromised state.   Neurological:  Negative for dizziness, light-headedness and headaches.   Psychiatric/Behavioral:  Negative for agitation, behavioral problems and hallucinations.    Breast: Negative for mass         Objective     Physical Exam  Vitals and nursing note reviewed.   Constitutional:       Appearance: Normal appearance.   Cardiovascular:      Rate and Rhythm: Normal rate and regular rhythm.      Heart sounds: Normal heart sounds.   Pulmonary:      Effort: Pulmonary effort is normal.      Breath sounds: Normal breath sounds.   Musculoskeletal:         General: Normal range of motion.   Neurological:      Mental Status: She is alert and oriented to person, place, and time.   Psychiatric:         Mood and Affect: Mood normal.         Behavior: Behavior normal.            Assessment and Plan     1. Other specified hypothyroidism  -     levothyroxine (SYNTHROID) 137 MCG Tab tablet; Take 1  tablet (137 mcg total) by mouth before breakfast.  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 Thrombocytopenia, unspecified    5. Insomnia, unspecified type  -     traZODone (DESYREL) 150 MG tablet; Take 1 tablet (150 mg total) by mouth nightly.  Dispense: 90 tablet; Refill: 3    6. Depression, unspecified depression type  -     sertraline (ZOLOFT) 100 MG tablet; Take 1.5 tablets (150 mg total) by mouth once daily.  Dispense: 135 tablet; Refill: 3    7. Need for vaccination  -     (In Office Administered) Pneumococcal Conjugate Vaccine (20 Valent) (IM) (Preferred)        Will call pt with lab results.          Follow up in about 6 months (around 10/2/2024).

## 2024-04-03 DIAGNOSIS — E78.49 OTHER HYPERLIPIDEMIA: Primary | ICD-10-CM

## 2024-04-03 RX ORDER — ROSUVASTATIN CALCIUM 10 MG/1
10 TABLET, COATED ORAL NIGHTLY
Qty: 90 TABLET | Refills: 3 | Status: SHIPPED | OUTPATIENT
Start: 2024-04-03

## 2024-04-09 DIAGNOSIS — Z71.89 COMPLEX CARE COORDINATION: ICD-10-CM

## 2024-05-01 ENCOUNTER — HOSPITAL ENCOUNTER (EMERGENCY)
Facility: HOSPITAL | Age: 84
Discharge: HOME | End: 2024-05-02
Attending: EMERGENCY MEDICINE
Payer: MEDICARE
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
## 2024-05-01 DIAGNOSIS — R00.0 TACHYCARDIA: ICD-10-CM

## 2024-05-01 DIAGNOSIS — S02.2XXA CLOSED FRACTURE OF NASAL BONE, INITIAL ENCOUNTER: ICD-10-CM

## 2024-05-01 DIAGNOSIS — S01.81XA FACIAL LACERATION, INITIAL ENCOUNTER: ICD-10-CM

## 2024-05-01 DIAGNOSIS — S00.83XA FACIAL HEMATOMA, INITIAL ENCOUNTER: ICD-10-CM

## 2024-05-01 PROCEDURE — 93005 ELECTROCARDIOGRAM TRACING: CPT

## 2024-05-01 PROCEDURE — 96374 THER/PROPH/DIAG INJ IV PUSH: CPT

## 2024-05-01 PROCEDURE — 25000003 PHARM REV CODE 250: Performed by: EMERGENCY MEDICINE

## 2024-05-01 PROCEDURE — 99285 EMERGENCY DEPT VISIT HI MDM: CPT | Mod: 25

## 2024-05-01 PROCEDURE — 93010 ELECTROCARDIOGRAM REPORT: CPT | Mod: ,,, | Performed by: INTERNAL MEDICINE

## 2024-05-01 PROCEDURE — 99285 EMERGENCY DEPT VISIT HI MDM: CPT | Mod: ,,, | Performed by: EMERGENCY MEDICINE

## 2024-05-01 RX ORDER — DILTIAZEM HYDROCHLORIDE 5 MG/ML
20 INJECTION INTRAVENOUS
Status: COMPLETED | OUTPATIENT
Start: 2024-05-01 | End: 2024-05-01

## 2024-05-01 RX ADMIN — DILTIAZEM HYDROCHLORIDE 20 MG: 5 INJECTION INTRAVENOUS at 11:05

## 2024-05-02 ENCOUNTER — TELEPHONE (OUTPATIENT)
Dept: EMERGENCY MEDICINE | Facility: HOSPITAL | Age: 84
End: 2024-05-02
Payer: MEDICARE

## 2024-05-02 VITALS
BODY MASS INDEX: 25.18 KG/M2 | DIASTOLIC BLOOD PRESSURE: 65 MMHG | HEART RATE: 84 BPM | SYSTOLIC BLOOD PRESSURE: 119 MMHG | RESPIRATION RATE: 11 BRPM | TEMPERATURE: 98 F | OXYGEN SATURATION: 96 % | WEIGHT: 156 LBS

## 2024-05-02 PROCEDURE — 25000003 PHARM REV CODE 250: Performed by: EMERGENCY MEDICINE

## 2024-05-02 RX ORDER — ACETAMINOPHEN 500 MG
1000 TABLET ORAL
Status: COMPLETED | OUTPATIENT
Start: 2024-05-02 | End: 2024-05-02

## 2024-05-02 RX ADMIN — ACETAMINOPHEN 1000 MG: 500 TABLET ORAL at 12:05

## 2024-05-02 NOTE — ED PROVIDER NOTES
Encounter Date: 5/1/2024    SCRIBE #1 NOTE: I, Dionna Trena, am scribing for, and in the presence of,  Desmond Williamson MD. I have scribed the entire note.       History     Chief Complaint   Patient presents with    Fall     Fell at around 2030 and hit head on door. Denies LOC.      84 year old female presents to the ED complaining of fall. Patient says that her left foot got tripped on a rug while she was leaving [facility] and says that her head hit the door. She denies any LOC. Patient denies any nausea, vomiting, or confusion but says that she has some pain to the back of her neck. She also has a laceration above her left eyebrow along with a skin tear to her left arm and bruising around her left cheek bone and nose. Patient denies taking any blood thinners.     The history is provided by the patient. No  was used.     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 Gomez: + for invasive ductal carcinoma    PONV (postoperative nausea and vomiting)     Skin lesion 07/11/2023    With associated soft tissue mass     Thyroid disease      Past Surgical History:   Procedure Laterality Date    AXILLARY NODE DISSECTION Left 08/31/2022    Procedure: LYMPHADENECTOMY, AXILLARY;  Surgeon: Dany Paula MD;  Location: [facility] OR;  Service: General;  Laterality: Left;    BREAST BIOPSY Left 08/31/2022    Procedure: BIOPSY, BREAST, WITH LUMPECTOMY;  Surgeon: Dany Paula MD;  Location: [facility] OR;  Service: General;  Laterality:  Left;    BREAST LUMPECTOMY      CHOLECYSTECTOMY      HYSTERECTOMY      JOINT REPLACEMENT      LAPAROSCOPIC CHOLECYSTECTOMY N/A 08/31/2022    Procedure: CHOLECYSTECTOMY, LAPAROSCOPIC;  Surgeon: Dany Paula MD;  Location: [facility];  Service: General;  Laterality: N/A;    SENTINEL LYMPH NODE BIOPSY Left 08/31/2022    Procedure: BIOPSY, LYMPH NODE, SENTINEL;  Surgeon: Dany Paula MD;  Location: [facility] OR;  Service: General;  Laterality: Left;     Family History   Problem Relation Name Age of Onset    Breast cancer Paternal Aunt       Social History     Tobacco Use    Smoking status: Never    Smokeless tobacco: Never   Substance Use Topics    Alcohol use: Never    Drug use: Never     Review of Systems   Gastrointestinal:  Negative for nausea and vomiting.   Musculoskeletal:  Positive for neck pain.   Skin:         Laceration above left eyebrow.    Psychiatric/Behavioral:  Negative for confusion.    All other systems reviewed and are negative.      Physical Exam     Initial Vitals [05/01/24 2134]   BP Pulse Resp Temp SpO2   (!) 182/83 95 18 97.7 °F (36.5 °C) 97 %      MAP       --         Physical Exam    Nursing note and vitals reviewed.  HENT:   4 cm laceration above left eyebrow.  Bruising around left cheek bone and nose.    Eyes: Conjunctivae are normal. Pupils are equal, round, and reactive to light.   Neck:   Normal range of motion.  Cardiovascular:  Regular rhythm and normal heart sounds.           Pulmonary/Chest: Breath sounds normal.   Musculoskeletal:         General: Normal range of motion.      Cervical back: Normal range of motion.     Neurological: She is alert and oriented to person, place, and time. She has normal strength and normal reflexes.   Skin:   Skin tear on left arm.          ED Course   Procedures  Labs Reviewed - No data to display       Imaging Results              CT Head Without Contrast (In process)                      CT Cervical Spine Without Contrast (In process)                       CT Maxillofacial Without Contrast (In process)                      Medications   diltiaZEM injection 20 mg (20 mg Intravenous Given 5/1/24 2353)   acetaminophen tablet 1,000 mg (1,000 mg Oral Given 5/2/24 0044)     Medical Decision Making  Amount and/or Complexity of Data Reviewed  Radiology: ordered.    Risk  OTC drugs.  Prescription drug management.              Attending Attestation:           Physician Attestation for Scribe:  Physician Attestation Statement for Scribe #1: I, Desmond Williamson MD, reviewed documentation, as scribed by Dionna Albrecht in my presence, and it is both accurate and complete.             ED Course as of 05/02/24 0048   Wed May 01, 2024   2150 Medical decision-making:  Differential diagnosis includes fall, facial laceration, subdural hemorrhage, subarachnoid hemorrhage, facial bone fracture, cervical spine fracture.  Imaging ordered by me and interpreted by Radiology. [BB]   2336 CT facial bones shows nasal fracture, no other acute findings. [BB]   u May 02, 2024   0019 CT cervical spine shows no acute fracture. [BB]   0020 CT brain shows no acute findings. [BB]   0046 Patient had an episode of tachycardia with rate of around 130.  EKG showed sinus tachycardia.  Rate returned to normal after Cardizem.  No evidence of AFib.  On repeat examination patient is feeling better her only complaint is facial pain.  Ready for discharge home. [BB]   0048 Patient declined prescription pain medication. [BB]      ED Course User Index  [BB] Desmond Williamson MD                             Clinical Impression:  Final diagnoses:  [W19.XXXA] Fall, initial encounter (Primary)  [S01.81XA] Facial laceration, initial encounter  [S00.83XA] Facial hematoma, initial encounter  [R00.0] Tachycardia  [S02.2XXA] Closed fracture of nasal bone, initial encounter          ED Disposition Condition    Discharge Stable          ED Prescriptions    None       Follow-up Information    None          Desmond Williamson,  MD  05/02/24 0048

## 2024-05-02 NOTE — DISCHARGE INSTRUCTIONS
Use ice pack to help with pain and swelling.  Return to emergency department for any worsening or further problems.  Follow up in clinic with primary care provider as needed.

## 2024-05-10 ENCOUNTER — HOSPITAL ENCOUNTER (OUTPATIENT)
Dept: RADIOLOGY | Facility: HOSPITAL | Age: 84
Discharge: HOME | End: 2024-05-10
Attending: NURSE PRACTITIONER
Payer: MEDICARE

## 2024-05-10 ENCOUNTER — OFFICE VISIT (OUTPATIENT)
Dept: PRIMARY CARE CLINIC | Facility: CLINIC | Age: 84
End: 2024-05-10
Payer: MEDICARE

## 2024-05-10 VITALS
BODY MASS INDEX: 25.07 KG/M2 | TEMPERATURE: 98 F | DIASTOLIC BLOOD PRESSURE: 76 MMHG | SYSTOLIC BLOOD PRESSURE: 130 MMHG | HEIGHT: 66 IN | OXYGEN SATURATION: 99 % | WEIGHT: 156 LBS | HEART RATE: 64 BPM
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
## 2024-05-10 DIAGNOSIS — M25.562 ACUTE PAIN OF BOTH KNEES: ICD-10-CM

## 2024-05-10 DIAGNOSIS — M25.561 ACUTE PAIN OF BOTH KNEES: Primary | ICD-10-CM

## 2024-05-10 DIAGNOSIS — M25.562 ACUTE PAIN OF BOTH KNEES: Primary | ICD-10-CM

## 2024-05-10 DIAGNOSIS — N18.31 CHRONIC KIDNEY DISEASE, STAGE 3A: ICD-10-CM

## 2024-05-10 DIAGNOSIS — M25.561 ACUTE PAIN OF BOTH KNEES: ICD-10-CM

## 2024-05-10 PROCEDURE — 99213 OFFICE O/P EST LOW 20 MIN: CPT | Mod: ,,, | Performed by: NURSE PRACTITIONER

## 2024-05-10 PROCEDURE — 73560 X-RAY EXAM OF KNEE 1 OR 2: CPT | Mod: 26,50,, | Performed by: STUDENT IN AN ORGANIZED HEALTH CARE EDUCATION/TRAINING PROGRAM

## 2024-05-10 PROCEDURE — 73560 X-RAY EXAM OF KNEE 1 OR 2: CPT | Mod: TC,50

## 2024-05-10 RX ORDER — CYCLOBENZAPRINE HCL 5 MG
5 TABLET ORAL NIGHTLY
Qty: 30 TABLET | Refills: 0 | Status: SHIPPED | OUTPATIENT
Start: 2024-05-10

## 2024-05-10 NOTE — PROGRESS NOTES
Subjective     Patient ID: Hyedi Ceja is a 84 y.o. female.    Chief Complaint: Er Follow up     Pt presents for a er follow-up from a fall 1 week ago. Pt is able to ambulate but reports persisting pain to BL knees. She is requesting a muscle relaxer.       Review of Systems   Constitutional:  Negative for activity change, appetite change, chills, fatigue and fever.   HENT:  Negative for nasal congestion, ear discharge, nosebleeds, postnasal drip, rhinorrhea, sinus pressure/congestion, sneezing, sore throat and tinnitus.    Eyes:  Negative for pain, discharge, redness and itching.   Respiratory:  Negative for cough, choking, chest tightness, shortness of breath and wheezing.    Cardiovascular:  Negative for chest pain.   Gastrointestinal:  Negative for abdominal distention, abdominal pain, blood in stool, change in bowel habit, constipation, diarrhea, nausea and vomiting.   Genitourinary:  Negative for decreased urine volume, dysuria, flank pain and frequency.   Musculoskeletal:  Negative for back pain and gait problem.        Bl knee pain   Integumentary:  Negative for wound, breast mass and breast discharge.   Allergic/Immunologic: Negative for immunocompromised state.   Neurological:  Negative for dizziness, light-headedness and headaches.   Psychiatric/Behavioral:  Negative for agitation, behavioral problems and hallucinations.    Breast: Negative for mass         Objective     Physical Exam  Vitals and nursing note reviewed.   Constitutional:       Appearance: Normal appearance.   Cardiovascular:      Rate and Rhythm: Normal rate and regular rhythm.      Heart sounds: Normal heart sounds.   Pulmonary:      Effort: Pulmonary effort is normal.      Breath sounds: Normal breath sounds.   Musculoskeletal:         General: Normal range of motion.   Neurological:      Mental Status: She is alert and oriented to person, place, and time.   Psychiatric:         Mood and Affect: Mood normal.         Behavior:  Behavior normal.            Assessment and Plan     1. Acute pain of both knees  -     X-Ray Knee 1 or 2 View Bilateral; Future; Expected date: 05/10/2024    2. Chronic kidney disease, stage 3a    3. Fall, subsequent encounter  -     cyclobenzaprine (FLEXERIL) 5 MG tablet; Take 1 tablet (5 mg total) by mouth nightly. DO NOT TAKE WHILE DRIVING  Dispense: 30 tablet; Refill: 0        Will call pt with xray results.          Follow up in about 3 months (around 8/10/2024).

## 2024-05-14 DIAGNOSIS — M25.562 ACUTE PAIN OF BOTH KNEES: Primary | ICD-10-CM

## 2024-05-14 DIAGNOSIS — M25.561 ACUTE PAIN OF BOTH KNEES: Primary | ICD-10-CM

## 2024-05-14 LAB
OHS QRS DURATION: 86 MS
OHS QTC CALCULATION: 464 MS

## 2024-05-15 ENCOUNTER — OFFICE VISIT (OUTPATIENT)
Dept: ORTHOPEDICS | Facility: CLINIC | Age: 84
End: 2024-05-15
Payer: MEDICARE

## 2024-05-15 DIAGNOSIS — M25.562 ACUTE PAIN OF BOTH KNEES: ICD-10-CM

## 2024-05-15 DIAGNOSIS — M25.561 ACUTE PAIN OF BOTH KNEES: ICD-10-CM

## 2024-05-15 DIAGNOSIS — M17.0 TRICOMPARTMENT OSTEOARTHRITIS OF KNEES, BILATERAL: Primary | ICD-10-CM

## 2024-05-15 PROCEDURE — 99999PBSHW PR PBB SHADOW TECHNICAL ONLY FILED TO HB: Mod: JZ,PBBFAC,,

## 2024-05-15 PROCEDURE — 20610 DRAIN/INJ JOINT/BURSA W/O US: CPT | Mod: PBBFAC,LT | Performed by: NURSE PRACTITIONER

## 2024-05-15 PROCEDURE — 99213 OFFICE O/P EST LOW 20 MIN: CPT | Mod: PBBFAC,25 | Performed by: NURSE PRACTITIONER

## 2024-05-15 PROCEDURE — 99213 OFFICE O/P EST LOW 20 MIN: CPT | Mod: S$PBB,25,, | Performed by: NURSE PRACTITIONER

## 2024-05-15 RX ADMIN — Medication 48 MG: at 02:05

## 2024-05-15 NOTE — PROGRESS NOTES
ASSESSMENT:      ICD-10-CM ICD-9-CM   1. Acute pain of both knees  M25.561 338.19    M25.562 719.46       PLAN:     Findings and treatment options were discussed with the patient regarding the diagnosis.   All questions were answered regarding Heydi Ceja 's painful knee.      Natural history and expected course discussed. Questions answered. Educational materials distributed. Reduction in offending activity. OTC analgesics as needed. Arthrocentesis. See procedure note.    There are no Patient Instructions on file for this visit.    IMAGING:   X-Ray Knee 1 or 2 View Bilateral    Result Date: 5/10/2024  EXAMINATION: XR KNEE 1 OR 2 VIEW BILATERAL CLINICAL HISTORY: Pain in right knee TECHNIQUE: XR KNEE 1 OR 2 VIEW BILATERAL COMPARISON: None FINDINGS: No acute fracture or dislocation. Moderate degenerative change of the medial compartments.  Moderate to severe degenerative change of the lateral compartment of the left knee.  Moderate degenerative change of the lateral compartment of the right knee.  Mild-to-moderate degenerative change of the patellofemoral compartments. No radiopaque foreign bodies.     No acute fracture or dislocation. Moderate degenerative change of the medial compartments. Moderate to severe degenerative change of the lateral compartment of the left knee. Moderate degenerative change of the lateral compartment of the right knee. Mild-to-moderate degenerative change of the patellofemoral compartments. Electronically signed by: Samuel Levy Date:    05/10/2024 Time:    10:52    CT Cervical Spine Without Contrast    Result Date: 5/2/2024  EXAMINATION: CT CERVICAL SPINE WITHOUT CONTRAST CLINICAL HISTORY: Neck trauma (Age >= 65y); TECHNIQUE: CT of the cervical spine performed without intravenous contrast. The CT examination was performed using one or more of the following dose reduction techniques: Automated exposure control, adjustment of the mA and kV according to patient's size, use  of acute or iterative reconstruction techniques. COMPARISON: None FINDINGS: No fracture detected.  No dislocation.  Craniocervical junction intact.  Moderate degenerative endplate and facet changes are seen with spinal canal and foraminal stenosis throughout the cervical spine.     No acute fracture or dislocation of the cervical spine. Electronically signed by: Reji Cleary Date:    05/02/2024 Time:    07:37    CT Maxillofacial Without Contrast    Result Date: 5/2/2024  EXAMINATION: CT MAXILLOFACIAL WITHOUT CONTRAST CLINICAL HISTORY: Facial trauma, blunt; TECHNIQUE: Low dose axial images, sagittal and coronal reformations were obtained through the face.  Contrast was not administered. The CT examination was performed using one or more of the following dose reduction techniques: Automated exposure control, adjustment of the mA and kV according to patient's size, use of acute or iterative reconstruction techniques. COMPARISON: None FINDINGS: There are acute appearing bilateral minimally rightward angulated nasal bone fractures as well as a fracture of the bony nasal septum anteriorly.  No additional maxillofacial fracture identified.  The orbits are intact.  Minimal fluid left maxillary sinus.  Temporomandibular joints show degeneration and are intact.  Dental disease.     Bilateral nasal bone and midline nasal septal fracture. Electronically signed by: Reji Cleary Date:    05/02/2024 Time:    07:35    CT Head Without Contrast    Result Date: 5/2/2024  EXAMINATION: CT HEAD WITHOUT CONTRAST CLINICAL HISTORY: Head trauma, intracranial venous injury suspected; TECHNIQUE: CT of the head performed without the use of intravenous contrast.  The CT examination was performed using one or more of the following dose reduction techniques: Automated exposure control, adjustment of the mA and kV according to patient's size, use of acute or iterative reconstruction techniques. COMPARISON: None. FINDINGS: No acute intracranial  hemorrhage.  No acute large vessel infarct.  No midline shift or herniation.  Mild chronic microvascular ischemic changes are seen.  Vascular calcifications.  Calvarium intact.  Mastoid air cells clear.     No acute intracranial abnormality identified. Electronically signed by: Reji Cleary Date:    05/02/2024 Time:    07:34         CC: Knee pain    84 y.o. Female who presents as a new patient to me for evaluation of  bilateral knee pain.  Patient had a fall about 2 weeks ago.  Has been having pain in both knees since that time.  She does have some swelling in both knees.  Reports she has had pain for months but has recently gotten worse since her fall.  She is alert steroids.  She does have limited motion of her knees.  Occupation:   Pain has been present for 2 weeks.  Injury description She had a bad fall about 2 weeks ago.   Mechanical symptoms, such as clicking, locking, and popping are not present.    Swelling and effusions  are present.   The patient does  describe symptoms of knee instability, such as the knee buckling and the knee giving way.   Symptoms are worsened with activity.  Better with rest. Treatment thus far has included rest, activity modifications, and oral medications.    she  has not had formal physical therapy.  she has not had prior injections injections into the knee.   she has not had previous advanced imaging such as MRI.     Here today to discuss diagnosis and treatment options.          REVIEW OF SYSTEMS:   Constitution: Negative. Negative for chills, fever and night sweats.    Hematologic/Lymphatic: Negative for bleeding problem. Does not bruise/bleed easily.   Skin: Negative for dry skin, itching and rash.   Musculoskeletal: Negative for falls. Positive for knee pain and muscle weakness.     All other review of symptoms were reviewed and found to be noncontributory.     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 Gomez: + for invasive ductal carcinoma    PONV (postoperative nausea and vomiting)     Skin lesion 07/11/2023    With associated soft tissue mass     Thyroid disease        PAST SURGICAL HISTORY:   Past Surgical History:   Procedure Laterality Date    AXILLARY NODE DISSECTION Left 08/31/2022    Procedure: LYMPHADENECTOMY, AXILLARY;  Surgeon: Dany Paula MD;  Location: [facility];  Service: General;  Laterality: Left;    BREAST BIOPSY Left 08/31/2022    Procedure: BIOPSY, BREAST, WITH LUMPECTOMY;  Surgeon: Dany Paula MD;  Location: [facility] OR;  Service: General;  Laterality: Left;    BREAST LUMPECTOMY      CHOLECYSTECTOMY      HYSTERECTOMY      JOINT REPLACEMENT      LAPAROSCOPIC CHOLECYSTECTOMY N/A 08/31/2022    Procedure: CHOLECYSTECTOMY, LAPAROSCOPIC;  Surgeon: Dany Paula MD;  Location: [facility];  Service: General;  Laterality: N/A;    SENTINEL LYMPH NODE BIOPSY Left 08/31/2022    Procedure: BIOPSY, LYMPH NODE, SENTINEL;  Surgeon: Dany Paula MD;  Location: [facility];  Service: General;  Laterality: Left;       FAMILY HISTORY:   Family History   Problem Relation Name Age of Onset    Breast cancer Paternal Aunt         SOCIAL HISTORY:   Social History     Socioeconomic History    Marital status: Single   Tobacco Use    Smoking status: Never    Smokeless tobacco: Never   Substance and Sexual Activity    Alcohol use: Never    Drug use: Never    Sexual activity: Not Currently       MEDICATIONS:     Current Outpatient Medications:     anastrozole (ARIMIDEX) 1 mg Tab, Take 1 mg by mouth., Disp: , Rfl:     azelastine (ASTELIN) 137 mcg (0.1 %) nasal spray, 1 spray (137 mcg total) by Nasal route 2 (two) times daily.,  Disp: 30 mL, Rfl: 5    cyclobenzaprine (FLEXERIL) 5 MG tablet, Take 1 tablet (5 mg total) by mouth nightly. DO NOT TAKE WHILE DRIVING, Disp: 30 tablet, Rfl: 0    levothyroxine (SYNTHROID) 137 MCG Tab tablet, Take 1 tablet (137 mcg total) by mouth before breakfast., Disp: 90 tablet, Rfl: 3    loratadine (CLARITIN) 10 mg tablet, Take 1 tablet (10 mg total) by mouth daily as needed for Allergies., Disp: 30 tablet, Rfl: 1    pimecrolimus (ELIDEL) 1 % cream, Apply to affected area twice daily., Disp: 100 g, Rfl: 1    rosuvastatin (CRESTOR) 10 MG tablet, Take 1 tablet (10 mg total) by mouth every evening., Disp: 90 tablet, Rfl: 3    sertraline (ZOLOFT) 100 MG tablet, Take 1.5 tablets (150 mg total) by mouth once daily., Disp: 135 tablet, Rfl: 3    traZODone (DESYREL) 150 MG tablet, Take 1 tablet (150 mg total) by mouth nightly., Disp: 90 tablet, Rfl: 3    ALLERGIES:   Review of patient's allergies indicates:   Allergen Reactions    Codeine     Corticosteroids (glucocorticoids)      Not able to tolerate        PHYSICAL EXAMINATION:    General    Constitutional: She is oriented to person, place, and time. She appears well-nourished.   HENT:   Head: Normocephalic and atraumatic.   Eyes: Pupils are equal, round, and reactive to light.   Neck: Neck supple.   Cardiovascular:  Normal rate and regular rhythm.            Pulmonary/Chest: Effort normal. No respiratory distress.   Abdominal: There is no abdominal tenderness. There is no guarding.   Neurological: She is alert and oriented to person, place, and time. She has normal reflexes.   Psychiatric: She has a normal mood and affect. Her behavior is normal. Judgment and thought content normal.           Right Knee Exam     Inspection   Swelling: present  Effusion: present    Tenderness   The patient is tender to palpation of the medial joint line and lateral joint line.    Crepitus   The patient has crepitus of the patella.    Range of Motion   Extension:  normal   Flexion:   abnormal     Tests   Meniscus   Pablo:  Medial - positive   Ligament Examination   Lachman: normal (-1 to 2mm)   PCL-Posterior Drawer: normal (0 to 2mm)     Patella   Patellar Tracking: normal  Patellar Grind: positive    Other   Sensation: normal    Left Knee Exam     Inspection   Swelling: present  Effusion: present    Tenderness   The patient tender to palpation of the medial joint line and lateral joint line.    Crepitus   The patient has crepitus of the patella.    Range of Motion   Extension:  normal   Flexion:  abnormal     Tests   Meniscus   Pablo:  Medial - positive   Stability   Lachman: normal (-1 to 2mm)   PCL-Posterior Drawer: normal (0 to 2mm)  Patella   Patellar Tracking: normal    Other   Sensation: normal    Muscle Strength   Right Lower Extremity   Quadriceps:  5/5   Hamstrin/5   Left Lower Extremity   Quadriceps:  5/5   Hamstrin/5     Reflexes     Left Side  Achilles:  2+  Quadriceps:  2+    Right Side   Achilles:  2+  Quadriceps:  2+    Vascular Exam     Right Pulses  Dorsalis Pedis:      2+  Posterior Tibial:      2+        Left Pulses  Dorsalis Pedis:      2+  Posterior Tibial:      2+        No orders of the defined types were placed in this encounter.      Large Joint Aspiration/Injection: L supra patellar bursa    Date/Time: 5/15/2024 2:20 PM    Performed by: Alexandrea Martinez FNP  Authorized by: Alexandrea Martinez FNP    Consent Done?:  Yes (Verbal)  Indications:  Pain  Site marked: the procedure site was marked    Local anesthetic:  Bupivacaine 0.25% without epinephrine    Details:  Needle Size:  22 G  Location:  Knee  Site:  L supra patellar bursa  Medications:  48 mg hylan g-f 20 48 mg/6 mL  Patient tolerance:  Patient tolerated the procedure well with no immediate complications

## 2024-07-15 ENCOUNTER — OFFICE VISIT (OUTPATIENT)
Dept: ORTHOPEDICS | Facility: CLINIC | Age: 84
End: 2024-07-15
Payer: MEDICARE

## 2024-07-15 DIAGNOSIS — M17.0 TRICOMPARTMENT OSTEOARTHRITIS OF KNEES, BILATERAL: Primary | ICD-10-CM

## 2024-07-15 PROCEDURE — 99999 PR PBB SHADOW E&M-EST. PATIENT-LVL III: CPT | Mod: PBBFAC,,, | Performed by: NURSE PRACTITIONER

## 2024-07-15 PROCEDURE — 99212 OFFICE O/P EST SF 10 MIN: CPT | Mod: S$PBB,,, | Performed by: NURSE PRACTITIONER

## 2024-07-15 PROCEDURE — 99213 OFFICE O/P EST LOW 20 MIN: CPT | Mod: PBBFAC | Performed by: NURSE PRACTITIONER

## 2024-07-15 NOTE — PROGRESS NOTES
84 y.o. Female returns to clinic for a follow up visit regarding     ICD-10-CM ICD-9-CM   1. Tricompartment osteoarthritis of knees, bilateral  M17.0 715.96        Patient is here today follow up bilateral knee pain.  She had bilateral knee injections May 15th.  Reports they helped tremendously she has been able to tell a difference.  A feel like they are still working at this time, have not fully worn off.  She takes over-the-counter NSAIDs as needed.  She has been very pleased.       Past Medical History:   Diagnosis Date    Breast cancer     Depression     Digestive disorder     Dysphagia 03/31/2023    Dysphagia following unspecified cerebrovascular disease 03/31/2023    Eczema     Gallstones 08/14/2022    History of breast cancer 01/29/2023    Hyperlipidemia     Insomnia     Left breast mass 08/14/2022    Left upper outer quadrant; trucut core Biopsy by Dr. Gomez: + for invasive ductal carcinoma    PONV (postoperative nausea and vomiting)     Skin lesion 07/11/2023    With associated soft tissue mass     Thyroid disease      Past Surgical History:   Procedure Laterality Date    AXILLARY NODE DISSECTION Left 08/31/2022    Procedure: LYMPHADENECTOMY, AXILLARY;  Surgeon: Dany Paula MD;  Location: [facility];  Service: General;  Laterality: Left;    BREAST BIOPSY Left 08/31/2022    Procedure: BIOPSY, BREAST, WITH LUMPECTOMY;  Surgeon: Dany Paula MD;  Location: [facility] OR;  Service: General;  Laterality: Left;    BREAST LUMPECTOMY      CHOLECYSTECTOMY      HYSTERECTOMY      JOINT REPLACEMENT      LAPAROSCOPIC CHOLECYSTECTOMY N/A 08/31/2022    Procedure: CHOLECYSTECTOMY, LAPAROSCOPIC;  Surgeon: Dany Paula MD;  Location: [facility] OR;  Service: General;  Laterality: N/A;    SENTINEL LYMPH NODE BIOPSY Left 08/31/2022    Procedure: BIOPSY, LYMPH NODE, SENTINEL;  Surgeon: Dany Paula MD;  Location: [facility] OR;  Service: General;  Laterality: Left;         PHYSICAL  EXAMINATION:    General    Constitutional: She is oriented to person, place, and time. She appears well-nourished.   HENT:   Head: Normocephalic and atraumatic.   Eyes: Pupils are equal, round, and reactive to light.   Neck: Neck supple.   Cardiovascular:  Normal rate and regular rhythm.            Pulmonary/Chest: Effort normal. No respiratory distress.   Abdominal: There is no abdominal tenderness. There is no guarding.   Neurological: She is alert and oriented to person, place, and time. She has normal reflexes.   Psychiatric: She has a normal mood and affect. Her behavior is normal. Judgment and thought content normal.           Right Knee Exam     Inspection   Swelling: present  Effusion: present    Tenderness   The patient is tender to palpation of the medial joint line and lateral joint line.    Crepitus   The patient has crepitus of the patella.    Range of Motion   Extension:  normal   Flexion:  abnormal     Tests   Meniscus   Pablo:  Medial - positive   Ligament Examination   Lachman: normal (-1 to 2mm)   PCL-Posterior Drawer: normal (0 to 2mm)     Patella   Patellar Tracking: normal  Patellar Grind: positive    Other   Sensation: normal    Left Knee Exam     Inspection   Swelling: present  Effusion: present    Tenderness   The patient tender to palpation of the medial joint line and lateral joint line.    Crepitus   The patient has crepitus of the patella.    Range of Motion   Extension:  normal   Flexion:  abnormal     Tests   Meniscus   Pablo:  Medial - positive   Stability   Lachman: normal (-1 to 2mm)   PCL-Posterior Drawer: normal (0 to 2mm)  Patella   Patellar Tracking: normal    Other   Sensation: normal    Muscle Strength   Right Lower Extremity   Quadriceps:  5/5   Hamstrin/5   Left Lower Extremity   Quadriceps:  5/5   Hamstrin/5     Reflexes     Left Side  Achilles:  2+  Quadriceps:  2+    Right Side   Achilles:  2+  Quadriceps:  2+    Vascular Exam     Right Pulses  Dorsalis  Pedis:      2+  Posterior Tibial:      2+        Left Pulses  Dorsalis Pedis:      2+  Posterior Tibial:      2+          IMAGING:  No results found.     ASSESSMENT:      ICD-10-CM ICD-9-CM   1. Tricompartment osteoarthritis of knees, bilateral  M17.0 715.96       PLAN:     -Findings and treatment options were discussed with the patient  -All questions answered      Patient instructed to follow up as needed.  Call when her injections wear off.  Continue over-the-counter anti-inflammatories.    There are no Patient Instructions on file for this visit.      No orders of the defined types were placed in this encounter.        Procedures

## 2024-10-02 ENCOUNTER — OFFICE VISIT (OUTPATIENT)
Dept: FAMILY MEDICINE | Facility: CLINIC | Age: 84
End: 2024-10-02
Payer: MEDICARE

## 2024-10-02 VITALS
OXYGEN SATURATION: 96 % | WEIGHT: 143 LBS | SYSTOLIC BLOOD PRESSURE: 124 MMHG | TEMPERATURE: 98 F | DIASTOLIC BLOOD PRESSURE: 70 MMHG | HEART RATE: 96 BPM | BODY MASS INDEX: 22.98 KG/M2 | HEIGHT: 66 IN

## 2024-10-02 DIAGNOSIS — F32.A DEPRESSION, UNSPECIFIED DEPRESSION TYPE: ICD-10-CM

## 2024-10-02 DIAGNOSIS — C77.3 SECONDARY AND UNSPECIFIED MALIGNANT NEOPLASM OF AXILLA AND UPPER LIMB LYMPH NODES: ICD-10-CM

## 2024-10-02 DIAGNOSIS — G47.00 INSOMNIA, UNSPECIFIED TYPE: ICD-10-CM

## 2024-10-02 DIAGNOSIS — Z78.0 POST-MENOPAUSAL: ICD-10-CM

## 2024-10-02 DIAGNOSIS — N18.31 CHRONIC KIDNEY DISEASE, STAGE 3A: ICD-10-CM

## 2024-10-02 DIAGNOSIS — E03.9 HYPOTHYROIDISM, UNSPECIFIED TYPE: Primary | ICD-10-CM

## 2024-10-02 LAB
ALBUMIN SERPL BCP-MCNC: 3.6 G/DL (ref 3.5–5)
ALBUMIN/GLOB SERPL: 1.3 {RATIO}
ALP SERPL-CCNC: 78 U/L (ref 55–142)
ALT SERPL W P-5'-P-CCNC: 18 U/L (ref 13–56)
ANION GAP SERPL CALCULATED.3IONS-SCNC: 6 MMOL/L (ref 7–16)
AST SERPL W P-5'-P-CCNC: 18 U/L (ref 15–37)
BASOPHILS # BLD AUTO: 0.03 K/UL (ref 0–0.2)
BASOPHILS NFR BLD AUTO: 0.5 % (ref 0–1)
BILIRUB SERPL-MCNC: 0.7 MG/DL (ref ?–1.2)
BUN SERPL-MCNC: 18 MG/DL (ref 7–18)
BUN/CREAT SERPL: 17 (ref 6–20)
CALCIUM SERPL-MCNC: 9 MG/DL (ref 8.5–10.1)
CHLORIDE SERPL-SCNC: 107 MMOL/L (ref 98–107)
CHOLEST SERPL-MCNC: 185 MG/DL (ref 0–200)
CHOLEST/HDLC SERPL: 4.1 {RATIO}
CO2 SERPL-SCNC: 30 MMOL/L (ref 21–32)
CREAT SERPL-MCNC: 1.07 MG/DL (ref 0.55–1.02)
DIFFERENTIAL METHOD BLD: ABNORMAL
EGFR (NO RACE VARIABLE) (RUSH/TITUS): 52 ML/MIN/1.73M2
EOSINOPHIL # BLD AUTO: 0.29 K/UL (ref 0–0.5)
EOSINOPHIL NFR BLD AUTO: 5.2 % (ref 1–4)
ERYTHROCYTE [DISTWIDTH] IN BLOOD BY AUTOMATED COUNT: 13.5 % (ref 11.5–14.5)
GLOBULIN SER-MCNC: 2.8 G/DL (ref 2–4)
GLUCOSE SERPL-MCNC: 88 MG/DL (ref 74–106)
HCT VFR BLD AUTO: 35.7 % (ref 38–47)
HDLC SERPL-MCNC: 45 MG/DL (ref 40–60)
HGB BLD-MCNC: 11.8 G/DL (ref 12–16)
IMM GRANULOCYTES # BLD AUTO: 0.02 K/UL (ref 0–0.04)
IMM GRANULOCYTES NFR BLD: 0.4 % (ref 0–0.4)
LDLC SERPL CALC-MCNC: 98 MG/DL
LDLC/HDLC SERPL: 2.2 {RATIO}
LYMPHOCYTES # BLD AUTO: 1.47 K/UL (ref 1–4.8)
LYMPHOCYTES NFR BLD AUTO: 26.3 % (ref 27–41)
MCH RBC QN AUTO: 30.6 PG (ref 27–31)
MCHC RBC AUTO-ENTMCNC: 33.1 G/DL (ref 32–36)
MCV RBC AUTO: 92.7 FL (ref 80–96)
MONOCYTES # BLD AUTO: 0.38 K/UL (ref 0–0.8)
MONOCYTES NFR BLD AUTO: 6.8 % (ref 2–6)
MPC BLD CALC-MCNC: 9.9 FL (ref 9.4–12.4)
NEUTROPHILS # BLD AUTO: 3.41 K/UL (ref 1.8–7.7)
NEUTROPHILS NFR BLD AUTO: 60.8 % (ref 53–65)
NONHDLC SERPL-MCNC: 140 MG/DL
NRBC # BLD AUTO: 0 X10E3/UL
NRBC, AUTO (.00): 0 %
PLATELET # BLD AUTO: 119 K/UL (ref 150–400)
POTASSIUM SERPL-SCNC: 4 MMOL/L (ref 3.5–5.1)
PROT SERPL-MCNC: 6.4 G/DL (ref 6.4–8.2)
RBC # BLD AUTO: 3.85 M/UL (ref 4.2–5.4)
SODIUM SERPL-SCNC: 139 MMOL/L (ref 136–145)
TRIGL SERPL-MCNC: 211 MG/DL (ref 35–150)
TSH SERPL DL<=0.005 MIU/L-ACNC: 0.03 UIU/ML (ref 0.36–3.74)
VLDLC SERPL-MCNC: 42 MG/DL
WBC # BLD AUTO: 5.6 K/UL (ref 4.5–11)

## 2024-10-02 PROCEDURE — 80053 COMPREHEN METABOLIC PANEL: CPT | Mod: ,,, | Performed by: CLINICAL MEDICAL LABORATORY

## 2024-10-02 PROCEDURE — 84443 ASSAY THYROID STIM HORMONE: CPT | Mod: ,,, | Performed by: CLINICAL MEDICAL LABORATORY

## 2024-10-02 PROCEDURE — 99213 OFFICE O/P EST LOW 20 MIN: CPT | Mod: ,,, | Performed by: NURSE PRACTITIONER

## 2024-10-02 PROCEDURE — 80061 LIPID PANEL: CPT | Mod: ,,, | Performed by: CLINICAL MEDICAL LABORATORY

## 2024-10-02 PROCEDURE — 85025 COMPLETE CBC W/AUTO DIFF WBC: CPT | Mod: ,,, | Performed by: CLINICAL MEDICAL LABORATORY

## 2024-10-02 NOTE — PROGRESS NOTES
Subjective     Patient ID: Heydi Ceja is a 84 y.o. female.    Chief Complaint:  6 Month fu for Hypothyroidism    Pt presents for a hypothyroidism follow-up.       Review of Systems   Constitutional:  Negative for activity change, appetite change, chills, fatigue and fever.   HENT:  Negative for nasal congestion, ear discharge, nosebleeds, postnasal drip, rhinorrhea, sinus pressure/congestion, sneezing, sore throat and tinnitus.    Eyes:  Negative for pain, discharge, redness and itching.   Respiratory:  Negative for cough, choking, chest tightness, shortness of breath and wheezing.    Cardiovascular:  Negative for chest pain.   Gastrointestinal:  Negative for abdominal distention, abdominal pain, blood in stool, change in bowel habit, constipation, diarrhea, nausea and vomiting.   Genitourinary:  Negative for decreased urine volume, dysuria, flank pain and frequency.   Musculoskeletal:  Negative for back pain and gait problem.   Integumentary:  Negative for wound, breast mass and breast discharge.   Allergic/Immunologic: Negative for immunocompromised state.   Neurological:  Negative for dizziness, light-headedness and headaches.   Psychiatric/Behavioral:  Negative for agitation, behavioral problems and hallucinations.    Breast: Negative for mass         Objective     Physical Exam  Vitals and nursing note reviewed.   Constitutional:       Appearance: Normal appearance.   Cardiovascular:      Rate and Rhythm: Normal rate and regular rhythm.      Heart sounds: Normal heart sounds.   Pulmonary:      Effort: Pulmonary effort is normal.      Breath sounds: Normal breath sounds.   Musculoskeletal:         General: Normal range of motion.   Neurological:      Mental Status: She is alert and oriented to person, place, and time.   Psychiatric:         Mood and Affect: Mood normal.         Behavior: Behavior normal.            Assessment and Plan     1. Hypothyroidism, unspecified type  -     CBC Auto Differential;  Future; Expected date: 10/02/2024  -     Comprehensive Metabolic Panel; Future; Expected date: 10/02/2024  -     Lipid Panel; Future; Expected date: 10/02/2024  -     TSH; Future; Expected date: 10/02/2024    2. Post-menopausal  -     DXA Bone Density Axial Skeleton 1 or more sites; Future; Expected date: 10/02/2024    3. Depression, unspecified depression type    4. Insomnia, unspecified type    5. Chronic kidney disease, stage 3a    6. Secondary and unspecified malignant neoplasm of axilla and upper limb lymph nodes        Will call pt with lab results.          Follow up in about 6 months (around 4/2/2025).

## 2024-10-04 DIAGNOSIS — E03.8 OTHER SPECIFIED HYPOTHYROIDISM: Primary | ICD-10-CM

## 2024-10-04 RX ORDER — LEVOTHYROXINE SODIUM 125 UG/1
125 TABLET ORAL
Qty: 90 TABLET | Refills: 3 | Status: SHIPPED | OUTPATIENT
Start: 2024-10-04

## 2024-10-04 NOTE — PROGRESS NOTES
Please let her know to stop the levothyroxine 137mcg and start the 125mcg dose. TSH on 12/5/2024. Low cholesterol diet.

## 2024-10-09 ENCOUNTER — HOSPITAL ENCOUNTER (OUTPATIENT)
Dept: RADIOLOGY | Facility: HOSPITAL | Age: 84
Discharge: HOME | End: 2024-10-09
Attending: NURSE PRACTITIONER
Payer: MEDICARE

## 2024-10-09 DIAGNOSIS — Z78.0 POST-MENOPAUSAL: ICD-10-CM

## 2024-10-10 ENCOUNTER — HOSPITAL ENCOUNTER (OUTPATIENT)
Dept: RADIOLOGY | Facility: HOSPITAL | Age: 84
Discharge: HOME | End: 2024-10-10
Attending: NURSE PRACTITIONER
Payer: MEDICARE

## 2024-10-10 DIAGNOSIS — Z78.0 POST-MENOPAUSAL: Primary | ICD-10-CM

## 2024-10-10 DIAGNOSIS — Z78.0 POST-MENOPAUSAL: ICD-10-CM

## 2024-10-10 DIAGNOSIS — M81.0 OSTEOPOROSIS, UNSPECIFIED OSTEOPOROSIS TYPE, UNSPECIFIED PATHOLOGICAL FRACTURE PRESENCE: Primary | ICD-10-CM

## 2024-10-10 PROCEDURE — 77080 DXA BONE DENSITY AXIAL: CPT | Mod: 26,,, | Performed by: RADIOLOGY

## 2024-10-10 PROCEDURE — 77080 DXA BONE DENSITY AXIAL: CPT | Mod: TC

## 2024-10-10 PROCEDURE — 77081 DXA BONE DENSITY APPENDICULR: CPT | Mod: 26,XU,, | Performed by: RADIOLOGY

## 2024-10-10 PROCEDURE — 77081 DXA BONE DENSITY APPENDICULR: CPT | Mod: TC

## 2024-10-22 ENCOUNTER — HOSPITAL ENCOUNTER (OUTPATIENT)
Dept: RADIOLOGY | Facility: HOSPITAL | Age: 84
Discharge: HOME | End: 2024-10-22
Attending: RADIOLOGY
Payer: MEDICARE

## 2024-10-22 DIAGNOSIS — Z12.31 VISIT FOR SCREENING MAMMOGRAM: ICD-10-CM

## 2024-10-22 DIAGNOSIS — R92.8 ABNORMAL MAMMOGRAM: ICD-10-CM

## 2024-10-22 PROCEDURE — 77063 BREAST TOMOSYNTHESIS BI: CPT | Mod: TC

## 2024-10-22 PROCEDURE — 76641 ULTRASOUND BREAST COMPLETE: CPT | Mod: TC,50

## 2024-10-22 PROCEDURE — 77063 BREAST TOMOSYNTHESIS BI: CPT | Mod: 26,,, | Performed by: RADIOLOGY

## 2024-10-22 PROCEDURE — 76641 ULTRASOUND BREAST COMPLETE: CPT | Mod: 26,50,, | Performed by: RADIOLOGY

## 2024-10-22 PROCEDURE — 77067 SCR MAMMO BI INCL CAD: CPT | Mod: 26,,, | Performed by: RADIOLOGY

## 2024-10-22 PROCEDURE — 77067 SCR MAMMO BI INCL CAD: CPT | Mod: TC

## 2024-10-23 ENCOUNTER — OFFICE VISIT (OUTPATIENT)
Dept: DERMATOLOGY | Facility: CLINIC | Age: 84
End: 2024-10-23
Payer: MEDICARE

## 2024-10-23 DIAGNOSIS — L08.9 STAPH SKIN INFECTION: Primary | ICD-10-CM

## 2024-10-23 DIAGNOSIS — B95.8 STAPH SKIN INFECTION: Primary | ICD-10-CM

## 2024-10-23 PROCEDURE — 87077 CULTURE AEROBIC IDENTIFY: CPT | Mod: ,,, | Performed by: CLINICAL MEDICAL LABORATORY

## 2024-10-23 PROCEDURE — 87186 SC STD MICRODIL/AGAR DIL: CPT | Mod: ,,, | Performed by: CLINICAL MEDICAL LABORATORY

## 2024-10-23 PROCEDURE — 87070 CULTURE OTHR SPECIMN AEROBIC: CPT | Mod: ,,, | Performed by: CLINICAL MEDICAL LABORATORY

## 2024-10-23 RX ORDER — CLINDAMYCIN HYDROCHLORIDE 300 MG/1
300 CAPSULE ORAL 3 TIMES DAILY
Qty: 21 CAPSULE | Refills: 0 | Status: SHIPPED | OUTPATIENT
Start: 2024-10-23 | End: 2024-10-30

## 2024-10-23 RX ORDER — MUPIROCIN 20 MG/G
OINTMENT TOPICAL 3 TIMES DAILY
Qty: 30 G | Refills: 3 | Status: SHIPPED | OUTPATIENT
Start: 2024-10-23

## 2024-10-23 NOTE — PROGRESS NOTES
[facility]   Marlyn Hong MD    Patient Name: Heydi Ceja  Patient YOB: 1940   Date of Service: 10/23/24    CC: Lesion    HPI: Heydi Ceja is a 84 y.o. female here today for lesion, located on the nose.  It has been present for 2 weeks.  Previous treatments include aloe vera and neosporin.     Past Medical History:   Diagnosis Date    Breast cancer     Depression     Digestive disorder     Dysphagia 03/31/2023    Dysphagia following unspecified cerebrovascular disease 03/31/2023    Eczema     Gallstones 08/14/2022    History of breast cancer 01/29/2023    Hyperlipidemia     Insomnia     Left breast mass 08/14/2022    Left upper outer quadrant; trucut core Biopsy by Dr. Gomez: + for invasive ductal carcinoma    PONV (postoperative nausea and vomiting)     Skin lesion 07/11/2023    With associated soft tissue mass     Thyroid disease      Past Surgical History:   Procedure Laterality Date    AXILLARY NODE DISSECTION Left 08/31/2022    Procedure: LYMPHADENECTOMY, AXILLARY;  Surgeon: Dany Paula MD;  Location: [facility];  Service: General;  Laterality: Left;    BREAST BIOPSY Left 08/31/2022    Procedure: BIOPSY, BREAST, WITH LUMPECTOMY;  Surgeon: Dany Paula MD;  Location: [facility] OR;  Service: General;  Laterality: Left;    BREAST LUMPECTOMY      CHOLECYSTECTOMY      HYSTERECTOMY      JOINT REPLACEMENT      LAPAROSCOPIC CHOLECYSTECTOMY N/A 08/31/2022    Procedure: CHOLECYSTECTOMY, LAPAROSCOPIC;  Surgeon: Dany Paula MD;  Location: [facility];  Service: General;  Laterality: N/A;    SENTINEL LYMPH NODE BIOPSY Left 08/31/2022    Procedure: BIOPSY, LYMPH NODE, SENTINEL;  Surgeon: Dany Paula MD;  Location: [facility];  Service: General;  Laterality: Left;     Review of patient's allergies indicates:   Allergen Reactions    Codeine     Corticosteroids (glucocorticoids)      Not able to tolerate       Current Outpatient Medications:     anastrozole (ARIMIDEX) 1 mg Tab,  Take 1 mg by mouth., Disp: , Rfl:     azelastine (ASTELIN) 137 mcg (0.1 %) nasal spray, 1 spray (137 mcg total) by Nasal route 2 (two) times daily., Disp: 30 mL, Rfl: 5    clindamycin (CLEOCIN) 300 MG capsule, Take 1 capsule (300 mg total) by mouth 3 (three) times daily. for 7 days, Disp: 21 capsule, Rfl: 0    cyclobenzaprine (FLEXERIL) 5 MG tablet, Take 1 tablet (5 mg total) by mouth nightly. DO NOT TAKE WHILE DRIVING, Disp: 30 tablet, Rfl: 0    levothyroxine (SYNTHROID) 125 MCG tablet, Take 1 tablet (125 mcg total) by mouth before breakfast., Disp: 90 tablet, Rfl: 3    loratadine (CLARITIN) 10 mg tablet, Take 1 tablet (10 mg total) by mouth daily as needed for Allergies., Disp: 30 tablet, Rfl: 1    mupirocin (BACTROBAN) 2 % ointment, Apply topically 3 (three) times daily., Disp: 30 g, Rfl: 3    pimecrolimus (ELIDEL) 1 % cream, Apply to affected area twice daily., Disp: 100 g, Rfl: 1    rosuvastatin (CRESTOR) 10 MG tablet, Take 1 tablet (10 mg total) by mouth every evening., Disp: 90 tablet, Rfl: 3    sertraline (ZOLOFT) 100 MG tablet, Take 1.5 tablets (150 mg total) by mouth once daily., Disp: 135 tablet, Rfl: 3    traZODone (DESYREL) 150 MG tablet, Take 1 tablet (150 mg total) by mouth nightly., Disp: 90 tablet, Rfl: 3    ROS: A focused review of systems was obtained and negative.     Exam: A focused skin exam was performed. All areas examined were normal except as mentioned in the assessment and plan below.  General Appearance of the patient is well developed and well nourished.  Orientation: alert and oriented x 3.  Mood and affect: pleasant.    Assessment:       Plan:   Abscess  - fluctuant erythematous nodule(s) located on the right nostril.    Plan: Counseling  I counseled the patient regarding the following:  Skin care: Abscesses need to undergo I&D for definitive treatment. Warm compresses and oral antibiotics are also recommended.  Expectations: Abscess are large collections of pus underneath the skin.  Lesions should be cultured to rule out MRSA.  Contact office if: Abscess fails to resolve despite treatment, or if patient develops fevers or chills.    - culture obtained, will start clinda and mupirocin per last culture sensitivity   Medications Ordered This Encounter   Medications    clindamycin (CLEOCIN) 300 MG capsule     Sig: Take 1 capsule (300 mg total) by mouth 3 (three) times daily. for 7 days     Dispense:  21 capsule     Refill:  0    mupirocin (BACTROBAN) 2 % ointment     Sig: Apply topically 3 (three) times daily.     Dispense:  30 g     Refill:  3         Follow up if symptoms worsen or fail to improve.    Marlyn Hong MD

## 2024-10-25 LAB — MICROORGANISM SPEC CULT: ABNORMAL

## 2025-02-27 RX ORDER — TAMOXIFEN CITRATE 20 MG/1
20 TABLET ORAL
COMMUNITY
Start: 2024-10-11

## 2025-03-05 ENCOUNTER — OFFICE VISIT (OUTPATIENT)
Dept: INTERNAL MEDICINE | Facility: CLINIC | Age: 85
End: 2025-03-05
Payer: MEDICARE

## 2025-03-05 VITALS — SYSTOLIC BLOOD PRESSURE: 120 MMHG | OXYGEN SATURATION: 94 % | HEART RATE: 92 BPM | DIASTOLIC BLOOD PRESSURE: 82 MMHG

## 2025-03-05 DIAGNOSIS — E03.9 HYPOTHYROIDISM, UNSPECIFIED TYPE: ICD-10-CM

## 2025-03-05 DIAGNOSIS — M81.0 OSTEOPOROSIS, UNSPECIFIED OSTEOPOROSIS TYPE, UNSPECIFIED PATHOLOGICAL FRACTURE PRESENCE: ICD-10-CM

## 2025-03-05 DIAGNOSIS — Z78.0 OSTEOPENIA AFTER MENOPAUSE: Primary | ICD-10-CM

## 2025-03-05 DIAGNOSIS — M85.80 OSTEOPENIA AFTER MENOPAUSE: Primary | ICD-10-CM

## 2025-03-05 DIAGNOSIS — Z90.710 HISTORY OF HYSTERECTOMY: ICD-10-CM

## 2025-03-05 DIAGNOSIS — Z85.3 HISTORY OF BREAST CANCER: ICD-10-CM

## 2025-03-05 PROCEDURE — 99999 PR PBB SHADOW E&M-EST. PATIENT-LVL IV: CPT | Mod: PBBFAC,,, | Performed by: NURSE PRACTITIONER

## 2025-03-05 PROCEDURE — 99214 OFFICE O/P EST MOD 30 MIN: CPT | Mod: PBBFAC | Performed by: NURSE PRACTITIONER

## 2025-03-05 PROCEDURE — 99214 OFFICE O/P EST MOD 30 MIN: CPT | Mod: S$PBB,,, | Performed by: NURSE PRACTITIONER

## 2025-03-05 NOTE — PROGRESS NOTES
HPI/CC  Ms. Heydi Ceja is an 84 year old  female referred for evaluation and management of osteoporosis/osteopenia by Shawna Ocampo, GISSELLEP, PCP.  DXA scan T-Scores in range of osteopenia.  Today, she complains of discomfort in shoulders and upper arms.  Recommended that she speak with her PCP about this discomfort.  She has a scheduled visit with Dr. Guaman, [facility],  in March 2025.  Medical history: She is treated for hypothyroidism and depression.  Has history of breast cancer treated with tamoxifen.  States she was treated with anastrozole; discontinued related to side effects (itching).  She has been diagnosed with CKD Stage 3A  Surgical history:  partial hysterectomy in her early 20s, bilateral hip arthroplasty    DXA Scan  I reviewed images and report of DXA scan performed at [facility] on  10/10/2024.  The distal forearm and lumbar spine were scanned.    T-Score -2.2 in distal forearm.  T-Score Total Spine -0.5      In range of osteopenia    Calcium and Vit D  Pt will begin Citracal Petites with D, two per day.  States her diet includes a variety of vegetables.    Weight Bearing Exercise/ Mobility  Not using assistive devices for ambulation    Falls  No recent falls    Dental Status  Dentulous.  Does not anticipate dental procedures.    Risk factors for osteoporosis/fracture  Positive for  Postmenopausal  female; history of partial hysterectomy in her twenties  Low body weight  History of breast cancer treated with SERM  Advanced age (84)  Negative for   No fractures in adulthood  No history of hip fracture in parents  No long term steroids  Does not smoke cigarettes  Does not drink alcohol  No history of long term use of steroids  Review of Systems   Constitutional: Negative for fever, chills, malaise.    Respiratory: Negative for cough and shortness of breath.    Cardiovascular: Negative for chest pain and palpitations.   Gastrointestinal: Negative for change in bowel  habit, constipation   Neurological: Negative for dizziness, syncope, weakness and light-headedness.   Musculoskeletal:  Does not require assistive devices for ambulation. Bilateral hip arthroplasties    Physical Exam  Constitutional:       Appearance: In no distress   HENT:      Mouth/Throat:      Mouth: Mucous membranes are moist.      Dental: Dentulous  Eyes:      Conjunctiva/sclera: Conjunctivae normal.   Cardiovascular:      Rate and Rhythm: Normal rate and regular rhythm.   Pulmonary:      Effort: Pulmonary effort is normal.   Musculoskeletal:      Cervical back: Normal range of motion.      Thoracic back: Mild kyphosis     Lumbar back: Normal range of motion. No scoliosis.      Gait:  Not using assistive devices for ambulation  Skin:     General: Skin is warm and dry.   Neurological:      Mental Status: She is alert and oriented to person, place, and time.     Assessment:       1. Postmenopausal osteopenia   2.      History of breast cancer  3.      Hypothyroidism   4.      History of hysterectomy  Plan:   I reviewed the images and report of DXA scan with pt.   Reviewed personal risk factors for osteoporosis  Offered overview of osteoporosis disease process and reviewed role of calcium, Vit D, and weight bearing exercise in bone health.  Reviewed body mechanics and falls precautions.    Reviewed role of anabolic and antiresorptive medications, including literature on risks and benefits of treatment vs non treatment with medications.  Literature on medications provided.  Recommended patient consider Prolia injections.  Reviewed expected benefits and possible serious side effects of Prolia.  Possible serious side effects include low calcium, severe jaw bone problems, unusual thigh bone fractures, serious infections, and skin problems.  Printed literature provided. Informed that calcium will be checked within 2 weeks of each Prolia injection.        Patient will update Dr. Guaman on her diagnosis of osteopenia  during next clinic visit.  Lab:  Vit D, BMP, PTH  6.   Return 3-4 weeks

## 2025-04-02 ENCOUNTER — OFFICE VISIT (OUTPATIENT)
Dept: FAMILY MEDICINE | Facility: CLINIC | Age: 85
End: 2025-04-02
Payer: MEDICARE

## 2025-04-02 VITALS
SYSTOLIC BLOOD PRESSURE: 112 MMHG | WEIGHT: 140 LBS | DIASTOLIC BLOOD PRESSURE: 76 MMHG | HEIGHT: 66 IN | HEART RATE: 100 BPM | BODY MASS INDEX: 22.5 KG/M2 | RESPIRATION RATE: 18 BRPM | TEMPERATURE: 98 F | OXYGEN SATURATION: 97 %

## 2025-04-02 DIAGNOSIS — C77.3 SECONDARY AND UNSPECIFIED MALIGNANT NEOPLASM OF AXILLA AND UPPER LIMB LYMPH NODES: ICD-10-CM

## 2025-04-02 DIAGNOSIS — N18.31 CHRONIC KIDNEY DISEASE, STAGE 3A: ICD-10-CM

## 2025-04-02 DIAGNOSIS — M81.0 OSTEOPOROSIS, UNSPECIFIED OSTEOPOROSIS TYPE, UNSPECIFIED PATHOLOGICAL FRACTURE PRESENCE: ICD-10-CM

## 2025-04-02 DIAGNOSIS — E03.9 HYPOTHYROIDISM, UNSPECIFIED TYPE: Primary | ICD-10-CM

## 2025-04-02 LAB
ALBUMIN SERPL BCP-MCNC: 3.9 G/DL (ref 3.4–4.8)
ALBUMIN/GLOB SERPL: 1.3 {RATIO}
ALP SERPL-CCNC: 69 U/L (ref 40–150)
ALT SERPL W P-5'-P-CCNC: 14 U/L
ANION GAP SERPL CALCULATED.3IONS-SCNC: 12 MMOL/L (ref 7–16)
AST SERPL W P-5'-P-CCNC: 22 U/L (ref 11–45)
BASOPHILS # BLD AUTO: 0.04 K/UL (ref 0–0.2)
BASOPHILS NFR BLD AUTO: 0.6 % (ref 0–1)
BILIRUB SERPL-MCNC: 0.8 MG/DL
BUN SERPL-MCNC: 22 MG/DL (ref 10–20)
BUN/CREAT SERPL: 14 (ref 6–20)
CALCIUM SERPL-MCNC: 9.2 MG/DL (ref 8.4–10.2)
CHLORIDE SERPL-SCNC: 104 MMOL/L (ref 98–107)
CHOLEST SERPL-MCNC: 207 MG/DL
CHOLEST/HDLC SERPL: 4.1 {RATIO}
CO2 SERPL-SCNC: 28 MMOL/L (ref 23–31)
CREAT SERPL-MCNC: 1.52 MG/DL (ref 0.55–1.02)
DIFFERENTIAL METHOD BLD: ABNORMAL
EGFR (NO RACE VARIABLE) (RUSH/TITUS): 34 ML/MIN/1.73M2
EOSINOPHIL # BLD AUTO: 0.23 K/UL (ref 0–0.5)
EOSINOPHIL NFR BLD AUTO: 3.7 % (ref 1–4)
ERYTHROCYTE [DISTWIDTH] IN BLOOD BY AUTOMATED COUNT: 13 % (ref 11.5–14.5)
GLOBULIN SER-MCNC: 2.9 G/DL (ref 2–4)
GLUCOSE SERPL-MCNC: 90 MG/DL (ref 82–115)
HCT VFR BLD AUTO: 38.2 % (ref 38–47)
HDLC SERPL-MCNC: 50 MG/DL (ref 35–60)
HGB BLD-MCNC: 12.6 G/DL (ref 12–16)
IMM GRANULOCYTES # BLD AUTO: 0.01 K/UL (ref 0–0.04)
IMM GRANULOCYTES NFR BLD: 0.2 % (ref 0–0.4)
LDLC SERPL CALC-MCNC: 112 MG/DL
LDLC/HDLC SERPL: 2.2 {RATIO}
LYMPHOCYTES # BLD AUTO: 1.25 K/UL (ref 1–4.8)
LYMPHOCYTES NFR BLD AUTO: 20.1 % (ref 27–41)
MCH RBC QN AUTO: 31.2 PG (ref 27–31)
MCHC RBC AUTO-ENTMCNC: 33 G/DL (ref 32–36)
MCV RBC AUTO: 94.6 FL (ref 80–96)
MONOCYTES # BLD AUTO: 0.36 K/UL (ref 0–0.8)
MONOCYTES NFR BLD AUTO: 5.8 % (ref 2–6)
MPC BLD CALC-MCNC: 10.3 FL (ref 9.4–12.4)
NEUTROPHILS # BLD AUTO: 4.32 K/UL (ref 1.8–7.7)
NEUTROPHILS NFR BLD AUTO: 69.6 % (ref 53–65)
NONHDLC SERPL-MCNC: 157 MG/DL
NRBC # BLD AUTO: 0 X10E3/UL
NRBC, AUTO (.00): 0 %
OVALOCYTES BLD QL SMEAR: ABNORMAL
PLATELET # BLD AUTO: 129 K/UL (ref 150–400)
PLATELET MORPHOLOGY: ABNORMAL
POTASSIUM SERPL-SCNC: 4.3 MMOL/L (ref 3.5–5.1)
PROT SERPL-MCNC: 6.8 G/DL (ref 5.8–7.6)
RBC # BLD AUTO: 4.04 M/UL (ref 4.2–5.4)
SODIUM SERPL-SCNC: 140 MMOL/L (ref 136–145)
TRIGL SERPL-MCNC: 225 MG/DL (ref 37–140)
TSH SERPL DL<=0.005 MIU/L-ACNC: 0.55 UIU/ML (ref 0.35–4.94)
VLDLC SERPL-MCNC: 45 MG/DL
WBC # BLD AUTO: 6.21 K/UL (ref 4.5–11)

## 2025-04-02 PROCEDURE — 80061 LIPID PANEL: CPT | Mod: ,,, | Performed by: CLINICAL MEDICAL LABORATORY

## 2025-04-02 PROCEDURE — 85025 COMPLETE CBC W/AUTO DIFF WBC: CPT | Mod: ,,, | Performed by: CLINICAL MEDICAL LABORATORY

## 2025-04-02 PROCEDURE — 84443 ASSAY THYROID STIM HORMONE: CPT | Mod: ,,, | Performed by: CLINICAL MEDICAL LABORATORY

## 2025-04-02 PROCEDURE — 99213 OFFICE O/P EST LOW 20 MIN: CPT | Mod: ,,, | Performed by: NURSE PRACTITIONER

## 2025-04-02 PROCEDURE — 80053 COMPREHEN METABOLIC PANEL: CPT | Mod: ,,, | Performed by: CLINICAL MEDICAL LABORATORY

## 2025-04-02 NOTE — PROGRESS NOTES
Subjective     Patient ID: Heydi Ceja is a 84 y.o. female.    Chief Complaint: 6 month check-up and Health Maintenance (TETANUS VACCINE decline/Shingles Vaccine(1 of 2)  decline/RSV Vaccine (Age 60+ and Pregnant patients)(1 - 1-dose 75+ series) decline/COVID-19 Vaccine(3 - 2024-25 season) decline)    Pt presents for a 6 month follow-up for hypothyroidism.       Review of Systems   Constitutional:  Negative for activity change, appetite change, chills, fatigue and fever.   HENT:  Negative for nasal congestion, ear discharge, nosebleeds, postnasal drip, rhinorrhea, sinus pressure/congestion, sneezing, sore throat and tinnitus.    Eyes:  Negative for pain, discharge, redness and itching.   Respiratory:  Negative for cough, choking, chest tightness, shortness of breath and wheezing.    Cardiovascular:  Negative for chest pain.   Gastrointestinal:  Negative for abdominal distention, abdominal pain, blood in stool, change in bowel habit, constipation, diarrhea, nausea and vomiting.   Genitourinary:  Negative for decreased urine volume, dysuria, flank pain and frequency.   Musculoskeletal:  Negative for back pain and gait problem.   Integumentary:  Negative for wound, breast mass and breast discharge.   Allergic/Immunologic: Negative for immunocompromised state.   Neurological:  Negative for dizziness, light-headedness and headaches.   Psychiatric/Behavioral:  Negative for agitation, behavioral problems and hallucinations.    Breast: Negative for mass         Objective     Physical Exam  Vitals and nursing note reviewed.   Constitutional:       Appearance: Normal appearance.   Cardiovascular:      Rate and Rhythm: Normal rate and regular rhythm.      Heart sounds: Normal heart sounds.   Pulmonary:      Effort: Pulmonary effort is normal.      Breath sounds: Normal breath sounds.   Musculoskeletal:         General: Normal range of motion.   Neurological:      Mental Status: She is alert and oriented to person,  place, and time.   Psychiatric:         Mood and Affect: Mood normal.         Behavior: Behavior normal.            Assessment and Plan     1. Hypothyroidism, unspecified type  -     CBC Auto Differential; Future; Expected date: 04/02/2025  -     Comprehensive Metabolic Panel; Future; Expected date: 04/02/2025  -     Lipid Panel; Future; Expected date: 04/02/2025  -     TSH; Future; Expected date: 04/02/2025  Pt reports compliance with thyroid med each morning     2. Chronic kidney disease, stage 3a  Avoid nsaids  Drink plenty of water    3. Osteoporosis, unspecified osteoporosis type, unspecified pathological fracture presence  Dxa scan every 2-3 years    4. Secondary and unspecified malignant neoplasm of axilla and upper limb lymph nodes        Will call pt with lab results.          Follow up in about 6 months (around 10/2/2025).

## 2025-04-03 ENCOUNTER — RESULTS FOLLOW-UP (OUTPATIENT)
Dept: FAMILY MEDICINE | Facility: CLINIC | Age: 85
End: 2025-04-03

## 2025-04-30 ENCOUNTER — OFFICE VISIT (OUTPATIENT)
Dept: INTERNAL MEDICINE | Facility: CLINIC | Age: 85
End: 2025-04-30
Payer: MEDICARE

## 2025-04-30 ENCOUNTER — RESULTS FOLLOW-UP (OUTPATIENT)
Dept: INTERNAL MEDICINE | Facility: CLINIC | Age: 85
End: 2025-04-30

## 2025-04-30 VITALS — HEART RATE: 100 BPM | SYSTOLIC BLOOD PRESSURE: 110 MMHG | DIASTOLIC BLOOD PRESSURE: 74 MMHG | OXYGEN SATURATION: 97 %

## 2025-04-30 DIAGNOSIS — E55.9 VITAMIN D DEFICIENCY: ICD-10-CM

## 2025-04-30 DIAGNOSIS — M81.0 OSTEOPOROSIS, UNSPECIFIED OSTEOPOROSIS TYPE, UNSPECIFIED PATHOLOGICAL FRACTURE PRESENCE: Primary | ICD-10-CM

## 2025-04-30 PROCEDURE — 99213 OFFICE O/P EST LOW 20 MIN: CPT | Mod: PBBFAC | Performed by: NURSE PRACTITIONER

## 2025-04-30 PROCEDURE — 99214 OFFICE O/P EST MOD 30 MIN: CPT | Mod: S$PBB,,, | Performed by: NURSE PRACTITIONER

## 2025-04-30 PROCEDURE — 99999 PR PBB SHADOW E&M-EST. PATIENT-LVL III: CPT | Mod: PBBFAC,,, | Performed by: NURSE PRACTITIONER

## 2025-04-30 RX ORDER — NAPROXEN SODIUM 220 MG
220 TABLET ORAL
COMMUNITY

## 2025-04-30 RX ORDER — ERGOCALCIFEROL 1.25 MG/1
50000 CAPSULE ORAL
Qty: 4 CAPSULE | Refills: 1 | Status: SHIPPED | OUTPATIENT
Start: 2025-04-30

## 2025-04-30 RX ORDER — MULTIVITAMIN
1 TABLET ORAL DAILY
COMMUNITY

## 2025-04-30 NOTE — PROGRESS NOTES
HPI/CC  Ms. Heydi Ceja is an 85 year old  female referred for evaluation and management of osteoporosis/osteopenia by Shawna Ocampo, FNP, PCP.  DXA scan T-Scores in range of osteopenia.  She returns to review lab results and plan of care  She continues to see Dr. Guaman, [facility].  Medical history: She is treated for hypothyroidism and depression.  Has history of breast cancer treated with tamoxifen.  States she was treated with anastrozole; discontinued related to side effects (itching).  She has been diagnosed with CKD Stage 3A  Surgical history:  partial hysterectomy in her early 20s, bilateral hip arthroplasty    Lab results reviewed:  3/5/25: 137.6     PTH   Vit D 23.4    DXA Scan  I reviewed images and report of DXA scan performed at [facility] on 10/10/2024.  The distal forearm and lumbar spine were scanned.    T-Score -2.2 in distal forearm.  T-Score Total Spine -0.5      In range of osteopenia    Calcium and Vit D  Has not started Citracal D as recommended during most recent encounter. States her diet includes a variety of vegetables.    Weight Bearing Exercise/ Mobility  Not using assistive devices for ambulation    Falls  No recent falls    Dental Status  Dentulous.  Does not anticipate dental procedures.    Risk factors for osteoporosis/fracture  Positive for  Postmenopausal  female; history of partial hysterectomy in her twenties  Low body weight  History of breast cancer treated with SERM  Advanced age (84)  Negative for   No fractures in adulthood  No history of hip fracture in parents  No long term steroids  Does not smoke cigarettes  Does not drink alcohol  No history of long term use of steroids  Review of Systems   Constitutional: Negative for fever, chills, malaise.    Respiratory: Negative for cough and shortness of breath.    Cardiovascular: Negative for chest pain and palpitations.   Gastrointestinal: Negative for change in bowel habit, constipation    Neurological: Negative for dizziness, syncope, weakness and light-headedness.   Musculoskeletal:  Does not require assistive devices for ambulation. Bilateral hip arthroplasties    Physical Exam  Constitutional:       Appearance: In no distress   HENT:      Mouth/Throat:      Mouth: Mucous membranes are moist.      Dental: Dentulous  Eyes:      Conjunctiva/sclera: Conjunctivae normal.   Cardiovascular:      Rate and Rhythm: Normal rate and regular rhythm.   Pulmonary:      Effort: Pulmonary effort is normal.   Musculoskeletal:      Cervical back: Normal range of motion.      Thoracic back: Mild kyphosis     Lumbar back: Normal range of motion. No scoliosis.      Gait:  Not using assistive devices for ambulation  Skin:     General: Skin is warm and dry.   Neurological:      Mental Status: She is alert and oriented to person, place, and time.     Assessment:       1. Postmenopausal osteopenia   2.      History of breast cancer  3.      Hypothyroidism   4.      History of hysterectomy  5.      Vit D deficiency  Plan:   Reviewed lab results with pt.  Instructed to take Ergocalciferol 50,000 IU once a week for total of eight doses, then return for PTH level.  Reviewed role of anabolic and antiresorptive medications, including literature on risks and benefits of treatment vs non treatment with medications.  Literature on medications provided.  Recommended patient consider Prolia injections.  Reviewed expected benefits and possible serious side effects of Prolia.  Possible serious side effects include low calcium, severe jaw bone problems, unusual thigh bone fractures, serious infections, and skin problems.  Printed literature provided. Informed that calcium will be checked within 2 weeks of each Prolia injection.  3.   Lab reports reviewed:  Vit D, BMP, PTH.   Will supplement Vit D with weekly Ergocalciferol for 8 doses and recheck PTH in 2 months  4.   Return 2 months for review of PTH level and continue discussion of  plan of care.  5.   PTH in 2 months  6.   RX for Ergocalciferol transmitted to [facility].  Dosing explained to pt

## 2025-05-15 DIAGNOSIS — F32.A DEPRESSION, UNSPECIFIED DEPRESSION TYPE: ICD-10-CM

## 2025-05-15 DIAGNOSIS — G47.00 INSOMNIA, UNSPECIFIED TYPE: ICD-10-CM

## 2025-05-20 RX ORDER — TRAZODONE HYDROCHLORIDE 150 MG/1
150 TABLET ORAL NIGHTLY
Qty: 83 TABLET | Refills: 0 | Status: SHIPPED | OUTPATIENT
Start: 2025-05-20

## 2025-05-20 RX ORDER — SERTRALINE HYDROCHLORIDE 100 MG/1
150 TABLET, FILM COATED ORAL
Qty: 135 TABLET | Refills: 0 | Status: SHIPPED | OUTPATIENT
Start: 2025-05-20

## 2025-07-08 ENCOUNTER — TELEPHONE (OUTPATIENT)
Dept: FAMILY MEDICINE | Facility: CLINIC | Age: 85
End: 2025-07-08
Payer: MEDICARE

## 2025-07-08 NOTE — TELEPHONE ENCOUNTER
Shawna Ocampo FNP to Me (Selected Message)        7/8/25  1:09 PM  No need for a repeat vit d but we can check it at her oct visit if she wants   Shawna Ocampo FNP      7/8/25  1:09 PM  Note  Copied from CRM #[id number]. Topic: Appointments - Appointment Scheduling  >> Jul 8, 2025 12:39 PM Med Assistant Nori wrote:  Who Called: Heydi Ceja     Caller is requesting a sooner appointment. Caller declined first available appointment listed below. Caller will not accept being placed on the waitlist and is requesting a message be sent to doctor.     When is the first available appointment?11-3  Options offered (Virtual Visit, Urgent Care):   Symptoms:        Preferred Method of Contact: Phone Call  Patient's Preferred Phone Number on File: [phone]   Best Call Back Number, if different:  Additional Information: needs afternoon appt with bloodwork after finishing vitamin d

## 2025-07-08 NOTE — TELEPHONE ENCOUNTER
Copied from CRM #[id number]. Topic: Appointments - Appointment Scheduling  >> Jul 8, 2025 12:39 PM Med Assistant Nori wrote:  Who Called: Heydi Jasmyne Ceja    Caller is requesting a sooner appointment. Caller declined first available appointment listed below. Caller will not accept being placed on the waitlist and is requesting a message be sent to doctor.    When is the first available appointment?11-3  Options offered (Virtual Visit, Urgent Care):   Symptoms:      Preferred Method of Contact: Phone Call  Patient's Preferred Phone Number on File: [phone]   Best Call Back Number, if different:  Additional Information: needs afternoon appt with bloodwork after finishing vitamin d

## 2025-07-22 ENCOUNTER — TELEPHONE (OUTPATIENT)
Dept: FAMILY MEDICINE | Facility: CLINIC | Age: 85
End: 2025-07-22
Payer: MEDICARE

## 2025-07-22 NOTE — TELEPHONE ENCOUNTER
Shawna Ocampo FNP to Me (Selected Message)        7/22/25  4:01 PM  I am unable to prescribe a muscle relaxer due to age  Shawna Ocampo FNP      7/22/25  4:01 PM  Note  Copied from CRM #[id number]. Topic: Medications - New Medication Request  >> Jul 22, 2025  1:48 PM Jessenia wrote:  Who Called: Heydi Ceja     Caller is requesting assistance/information from provider's office.     PT is requesting to speak w nurse      List of preferred pharmacies on file (remove unneeded): [unfilled]  If different, enter pharmacy into here including location and phone number: [facility] [address] [facility], [place] - [address]           Preferred Method of Contact: Phone Call  Patient's Preferred Phone Number on File: [phone]   Best Call Back Number, if different:  Additional Information: states she is requesting for a mild muscle and mild nerve relaxer.

## 2025-07-23 ENCOUNTER — OFFICE VISIT (OUTPATIENT)
Dept: FAMILY MEDICINE | Facility: CLINIC | Age: 85
End: 2025-07-23
Payer: MEDICARE

## 2025-07-23 VITALS
WEIGHT: 149 LBS | DIASTOLIC BLOOD PRESSURE: 80 MMHG | BODY MASS INDEX: 24.05 KG/M2 | TEMPERATURE: 97 F | SYSTOLIC BLOOD PRESSURE: 160 MMHG | OXYGEN SATURATION: 95 % | HEART RATE: 95 BPM

## 2025-07-23 DIAGNOSIS — N30.00 ACUTE CYSTITIS WITHOUT HEMATURIA: Primary | ICD-10-CM

## 2025-07-23 DIAGNOSIS — M54.50 CHRONIC BILATERAL LOW BACK PAIN WITHOUT SCIATICA: ICD-10-CM

## 2025-07-23 DIAGNOSIS — G89.29 CHRONIC BILATERAL LOW BACK PAIN WITHOUT SCIATICA: ICD-10-CM

## 2025-07-23 DIAGNOSIS — R11.0 NAUSEA: ICD-10-CM

## 2025-07-23 DIAGNOSIS — R30.0 DYSURIA: ICD-10-CM

## 2025-07-23 LAB
BILIRUB SERPL-MCNC: NEGATIVE MG/DL
BLOOD URINE, POC: ABNORMAL
CLARITY, UA: ABNORMAL
COLOR, UA: YELLOW
GLUCOSE UR QL STRIP: NEGATIVE
KETONES UR QL STRIP: NEGATIVE
LEUKOCYTE ESTERASE URINE, POC: ABNORMAL
NITRITE, POC UA: POSITIVE
PH, POC UA: 6
PROTEIN, POC: NEGATIVE
SPECIFIC GRAVITY, POC UA: 1.01
UROBILINOGEN, POC UA: 0.2

## 2025-07-23 PROCEDURE — 99213 OFFICE O/P EST LOW 20 MIN: CPT | Mod: ,,, | Performed by: NURSE PRACTITIONER

## 2025-07-23 PROCEDURE — 87086 URINE CULTURE/COLONY COUNT: CPT | Mod: ,,, | Performed by: CLINICAL MEDICAL LABORATORY

## 2025-07-23 RX ORDER — SULFAMETHOXAZOLE AND TRIMETHOPRIM 800; 160 MG/1; MG/1
1 TABLET ORAL 2 TIMES DAILY
Qty: 20 TABLET | Refills: 0 | Status: SHIPPED | OUTPATIENT
Start: 2025-07-23 | End: 2025-08-02

## 2025-07-23 RX ORDER — METHOCARBAMOL 500 MG/1
500 TABLET, FILM COATED ORAL NIGHTLY PRN
Qty: 30 TABLET | Refills: 0 | Status: SHIPPED | OUTPATIENT
Start: 2025-07-23 | End: 2025-08-07

## 2025-07-23 RX ORDER — ONDANSETRON 4 MG/1
4 TABLET, ORALLY DISINTEGRATING ORAL EVERY 12 HOURS PRN
Qty: 10 TABLET | Refills: 0 | Status: SHIPPED | OUTPATIENT
Start: 2025-07-23

## 2025-07-23 NOTE — PROGRESS NOTES
"Subjective:       Patient ID: Heydi Ceja is a 85 y.o. female.    Chief Complaint: Nausea, Diarrhea, and Dysuria    Presents to clinic with complaint dysuria and urgency with nausea for a couple of days.  Patient reports she buried her disabled son about a week ago.  She reports she has a "nervous stomach." And, she had some diarrhea last week.  The diarrhea has resolved.  Continues to have nausea but no vomiting.  Denies fever or abdominal pain.  She also requests a refill on a mild muscle relaxer to take for chronic lower back pain.  She has no acute back day.  She reports she has degenerative disc disease in her back and sometimes needs a muscle relaxer at nighttime.    Nausea  Associated symptoms include nausea.   Diarrhea     Dysuria   Associated symptoms include nausea.     Review of Systems   Gastrointestinal:  Positive for nausea. Negative for diarrhea.   Genitourinary:  Positive for dysuria.          Reviewed family, medical, surgical, and social history.    Objective:      BP (!) 160/80   Pulse 95   Temp 97.4 °F (36.3 °C) (Oral)   Wt 67.6 kg (149 lb)   SpO2 95%   BMI 24.05 kg/m²   Physical Exam  Vitals and nursing note reviewed.   Constitutional:       General: She is not in acute distress.     Appearance: Normal appearance. She is not ill-appearing, toxic-appearing or diaphoretic.   HENT:      Head: Normocephalic.      Mouth/Throat:      Mouth: Mucous membranes are moist.   Cardiovascular:      Rate and Rhythm: Normal rate and regular rhythm.      Heart sounds: Normal heart sounds.   Pulmonary:      Effort: Pulmonary effort is normal.      Breath sounds: Normal breath sounds.   Abdominal:      General: Abdomen is flat. Bowel sounds are normal. There is no distension.      Palpations: Abdomen is soft. There is no mass.      Tenderness: There is no abdominal tenderness. There is no right CVA tenderness, left CVA tenderness, guarding or rebound.      Hernia: No hernia is present. "   Musculoskeletal:      Cervical back: Normal range of motion and neck supple.   Skin:     General: Skin is warm and dry.      Capillary Refill: Capillary refill takes less than 2 seconds.   Neurological:      Mental Status: She is alert and oriented to person, place, and time.   Psychiatric:         Mood and Affect: Mood normal.         Behavior: Behavior normal.         Thought Content: Thought content normal.         Judgment: Judgment normal.            Office Visit on 07/23/2025   Component Date Value Ref Range Status    Color, UA POC 07/23/2025 YELLOW   Final    Clarity, UA, POC 07/23/2025 CLOUDY   Corrected    Spec Grav UA 07/23/2025 1.015   Final    pH, UA 07/23/2025 6.0   Final    WBC, UA 07/23/2025 SMALL   Final    Nitrite, UA 07/23/2025 POSITIVE   Final    Protein, POC 07/23/2025 NEGATIVE   Final    Glucose, UA 07/23/2025 NEGATIVE   Final    Ketones, UA 07/23/2025 NEGATIVE   Final    Bilirubin, POC 07/23/2025 NEGATIVE   Final    Urobilinogen, UA 07/23/2025 0.2   Final    Blood, UA 07/23/2025 TRACE   Final      Assessment:       1. Acute cystitis without hematuria    2. Dysuria    3. Chronic bilateral low back pain without sciatica    4. Nausea        Plan:       Acute cystitis without hematuria  -     sulfamethoxazole-trimethoprim 800-160mg (BACTRIM DS) 800-160 mg Tab; Take 1 tablet by mouth 2 (two) times daily. for 10 days  Dispense: 20 tablet; Refill: 0  -     Urine Culture High Risk ($$); Future; Expected date: 07/23/2025    Dysuria  -     POCT URINALYSIS W/O SCOPE    Chronic bilateral low back pain without sciatica  -     methocarbamoL (ROBAXIN) 500 MG Tab; Take 1 tablet (500 mg total) by mouth nightly as needed (muscle spasms).  Dispense: 30 tablet; Refill: 0    Nausea  -     ondansetron (ZOFRAN-ODT) 4 MG TbDL; Take 1 tablet (4 mg total) by mouth every 12 (twelve) hours as needed (N/V).  Dispense: 10 tablet; Refill: 0    Drink plenty of fluids   Return to the clinic as needed          Risks,  benefits, and side effects were discussed with the patient. All questions were answered to the fullest satisfaction of the patient, and pt verbalized understanding and agreement to treatment plan. Pt was to call with any new or worsening symptoms, or present to the ER.

## 2025-07-24 DIAGNOSIS — E55.9 VITAMIN D DEFICIENCY: ICD-10-CM

## 2025-07-24 DIAGNOSIS — E21.3 HYPERPARATHYROIDISM: Primary | ICD-10-CM

## 2025-07-24 DIAGNOSIS — Z78.0 OSTEOPENIA AFTER MENOPAUSE: ICD-10-CM

## 2025-07-24 DIAGNOSIS — M85.80 OSTEOPENIA AFTER MENOPAUSE: ICD-10-CM

## 2025-07-25 ENCOUNTER — TELEPHONE (OUTPATIENT)
Dept: FAMILY MEDICINE | Facility: CLINIC | Age: 85
End: 2025-07-25
Payer: MEDICARE

## 2025-07-25 ENCOUNTER — RESULTS FOLLOW-UP (OUTPATIENT)
Dept: FAMILY MEDICINE | Facility: CLINIC | Age: 85
End: 2025-07-25
Payer: MEDICARE

## 2025-07-25 DIAGNOSIS — N30.00 ACUTE CYSTITIS WITHOUT HEMATURIA: Primary | ICD-10-CM

## 2025-07-25 LAB — UA COMPLETE W REFLEX CULTURE PNL UR: ABNORMAL

## 2025-07-25 RX ORDER — CEFDINIR 300 MG/1
300 CAPSULE ORAL 2 TIMES DAILY
Qty: 20 CAPSULE | Refills: 0 | Status: SHIPPED | OUTPATIENT
Start: 2025-07-25 | End: 2025-08-04

## 2025-07-25 NOTE — TELEPHONE ENCOUNTER
----- Message from LINK Monte sent at 7/25/2025 10:59 AM CDT -----  Notify the urine culture shows antibiotic resistance to Bactrim.  I sent a different antibiotic input.  Please stop Bactrim and start new antibiotic.  Follow-up if symptoms are not improving after a   couple of days of new antibiotics and sooner if worse.  ----- Message -----  From: Jacqueline Cruz  Sent: 7/23/2025   2:27 PM CDT  To: LINK Monte

## 2025-07-25 NOTE — PROGRESS NOTES
Notify the urine culture shows antibiotic resistance to Bactrim.  I sent a different antibiotic input.  Please stop Bactrim and start new antibiotic.  Follow-up if symptoms are not improving after a couple of days of new antibiotics and sooner if worse.

## 2025-07-25 NOTE — PROGRESS NOTES
Notify urine culture shows antibiotic resistance to Bactrim.  I sent a different antibiotic in.  Please stop Bactrim and start new antibiotic

## 2025-07-25 NOTE — TELEPHONE ENCOUNTER
NA, LM TCC.      ----- Message from LINK Monte sent at 7/25/2025 10:58 AM CDT -----  Notify urine culture shows antibiotic resistance to Bactrim.  I sent a different antibiotic in.  Please stop Bactrim and start new antibiotic  ----- Message -----  From: Jacqueline Cruz  Sent: 7/23/2025   2:27 PM CDT  To: LINK Monte

## 2025-08-04 ENCOUNTER — TELEPHONE (OUTPATIENT)
Dept: FAMILY MEDICINE | Facility: CLINIC | Age: 85
End: 2025-08-04
Payer: MEDICARE

## 2025-08-04 NOTE — TELEPHONE ENCOUNTER
----- Message from LINK Monte sent at 7/25/2025 10:58 AM CDT -----  Notify urine culture shows antibiotic resistance to Bactrim.  I sent a different antibiotic in.  Please stop Bactrim and start new antibiotic  ----- Message -----  From: Jacqueline Cruz  Sent: 7/23/2025   2:27 PM CDT  To: LINK Monte

## 2025-08-04 NOTE — TELEPHONE ENCOUNTER
ttempted to notify patient x1 today and x1 04/01/25, with no answer and the son's phone has a busy signal.

## 2025-08-13 ENCOUNTER — OFFICE VISIT (OUTPATIENT)
Dept: INTERNAL MEDICINE | Facility: CLINIC | Age: 85
End: 2025-08-13
Payer: MEDICARE

## 2025-08-13 VITALS — HEART RATE: 93 BPM | OXYGEN SATURATION: 96 % | DIASTOLIC BLOOD PRESSURE: 80 MMHG | SYSTOLIC BLOOD PRESSURE: 134 MMHG

## 2025-08-13 DIAGNOSIS — N18.30 STAGE 3 CHRONIC KIDNEY DISEASE, UNSPECIFIED WHETHER STAGE 3A OR 3B CKD: Primary | ICD-10-CM

## 2025-08-13 PROCEDURE — G2211 COMPLEX E/M VISIT ADD ON: HCPCS | Mod: ,,, | Performed by: NURSE PRACTITIONER

## 2025-08-13 PROCEDURE — 99214 OFFICE O/P EST MOD 30 MIN: CPT | Mod: PBBFAC | Performed by: NURSE PRACTITIONER

## 2025-08-13 PROCEDURE — 99999 PR PBB SHADOW E&M-EST. PATIENT-LVL IV: CPT | Mod: PBBFAC,,, | Performed by: NURSE PRACTITIONER

## 2025-08-13 PROCEDURE — 99214 OFFICE O/P EST MOD 30 MIN: CPT | Mod: S$PBB,,, | Performed by: NURSE PRACTITIONER

## 2025-08-19 ENCOUNTER — OFFICE VISIT (OUTPATIENT)
Dept: NEPHROLOGY | Facility: CLINIC | Age: 85
End: 2025-08-19
Payer: MEDICARE

## 2025-08-19 VITALS
HEART RATE: 97 BPM | BODY MASS INDEX: 22.36 KG/M2 | WEIGHT: 151 LBS | RESPIRATION RATE: 18 BRPM | OXYGEN SATURATION: 98 % | SYSTOLIC BLOOD PRESSURE: 118 MMHG | HEIGHT: 69 IN | DIASTOLIC BLOOD PRESSURE: 74 MMHG

## 2025-08-19 DIAGNOSIS — N18.32 CKD STAGE 3B, GFR 30-44 ML/MIN: Primary | ICD-10-CM

## 2025-08-19 DIAGNOSIS — Z79.1 NSAID LONG-TERM USE: ICD-10-CM

## 2025-08-19 PROCEDURE — 99203 OFFICE O/P NEW LOW 30 MIN: CPT | Mod: S$PBB,,, | Performed by: NURSE PRACTITIONER

## 2025-08-19 PROCEDURE — 99999 PR PBB SHADOW E&M-EST. PATIENT-LVL V: CPT | Mod: PBBFAC,,, | Performed by: NURSE PRACTITIONER

## 2025-08-19 PROCEDURE — 99215 OFFICE O/P EST HI 40 MIN: CPT | Mod: PBBFAC | Performed by: NURSE PRACTITIONER

## 2025-08-19 RX ORDER — CEFDINIR 300 MG/1
300 CAPSULE ORAL 2 TIMES DAILY
COMMUNITY
Start: 2025-08-12

## 2025-08-20 ENCOUNTER — TELEPHONE (OUTPATIENT)
Dept: NEPHROLOGY | Facility: CLINIC | Age: 85
End: 2025-08-20
Payer: MEDICARE

## 2025-08-21 ENCOUNTER — TELEPHONE (OUTPATIENT)
Dept: NEPHROLOGY | Facility: CLINIC | Age: 85
End: 2025-08-21
Payer: MEDICARE

## (undated) DEVICE — SCISSORS INSERT ELECTROSCOPE

## (undated) DEVICE — Device

## (undated) DEVICE — SEE MEDLINE ITEM 152621

## (undated) DEVICE — SUT SILK 2.0 BLK 18

## (undated) DEVICE — STRIP MEDI WND CLSR 1/4X4IN

## (undated) DEVICE — STRIP MEDI WND CLSR 1/2X4IN

## (undated) DEVICE — CORD LAP 10 DISP

## (undated) DEVICE — GLOVE PROTEXIS PI SYN SURG 7.5

## (undated) DEVICE — DRAIN FLAT HUBLESS FULL 10MM

## (undated) DEVICE — SPONGE LAP XRAY STERILE 18X18

## (undated) DEVICE — ELECTRODE BLADE INSULATED 1 IN

## (undated) DEVICE — APPLIER CLIP ENDO LIGAMAX 5MM

## (undated) DEVICE — SYR IRRIGATION BULB STER 60ML

## (undated) DEVICE — CART INSERT SCISSOR F 5X34CM

## (undated) DEVICE — SUT CTD VICRYL 3-0 CR/SH

## (undated) DEVICE — TAPE DRAPE STRIP CLSR 4.5X24IN

## (undated) DEVICE — TROCAR ENDO Z THREAD KII 5X100

## (undated) DEVICE — KIT PROBE COVER WITH GEL

## (undated) DEVICE — RESERVOIR JACKSON-PRATT 100CC

## (undated) DEVICE — SUT PDS II O CT-2 VIL MONO

## (undated) DEVICE — TROCAR KII BLLN 12MM 10CM

## (undated) DEVICE — SOL NACL IRR 3000ML

## (undated) DEVICE — APPLIER LIGACLIP MED 11IN

## (undated) DEVICE — ELECTRODE LAPSCP L HOOK 36CM

## (undated) DEVICE — GLOVE PROTEXIS PI SYN SURG 8.0

## (undated) DEVICE — SUT 4-0 VICRYL / FS-2

## (undated) DEVICE — GLOVE PROTEXIS PI SYN SURG 7

## (undated) DEVICE — WARMER BLUE HEAT SCOPE 3-12MM

## (undated) DEVICE — CANNULA LAP SEAL Z THRD 5X100

## (undated) DEVICE — GOWN POLY REINF BRTH SLV XL